# Patient Record
Sex: MALE | Race: WHITE | NOT HISPANIC OR LATINO | Employment: UNEMPLOYED | URBAN - METROPOLITAN AREA
[De-identification: names, ages, dates, MRNs, and addresses within clinical notes are randomized per-mention and may not be internally consistent; named-entity substitution may affect disease eponyms.]

---

## 2017-01-19 ENCOUNTER — OFFICE VISIT (OUTPATIENT)
Dept: INTERNAL MEDICINE | Facility: CLINIC | Age: 22
End: 2017-01-19
Payer: COMMERCIAL

## 2017-01-19 VITALS
HEART RATE: 69 BPM | DIASTOLIC BLOOD PRESSURE: 64 MMHG | WEIGHT: 235.8 LBS | OXYGEN SATURATION: 97 % | HEIGHT: 70 IN | SYSTOLIC BLOOD PRESSURE: 145 MMHG | TEMPERATURE: 99.8 F | BODY MASS INDEX: 33.76 KG/M2

## 2017-01-19 DIAGNOSIS — R10.31 RIGHT GROIN PAIN: ICD-10-CM

## 2017-01-19 DIAGNOSIS — S93.602A SPRAIN OF LEFT FOOT, INITIAL ENCOUNTER: Primary | ICD-10-CM

## 2017-01-19 PROCEDURE — 99214 OFFICE O/P EST MOD 30 MIN: CPT | Performed by: INTERNAL MEDICINE

## 2017-01-19 NOTE — PROGRESS NOTES
SUBJECTIVE:                                                    Mateo Carpenter is a 21 year old male who presents to clinic today for the following health issues:        Musculoskeletal problem/pain      Duration: 1 day    Description  Location: left foot    Intensity:  7-8/10 when put weight on it    Accompanying signs and symptoms: none    History  Previous similar problem: no   Previous evaluation:  none    Precipitating or alleviating factors:  Trauma or overuse: YES- Dropped a 45lb weight on it yesterday  Aggravating factors include: walking and climbing stairs    Therapies tried and outcome: ice helped a little     Patient has been able to walk on this leg.  He has noticed some bruising.  No numbness or weakness of the foot.  Some pain when you apply pressure on the foot.        Bump near right inguinal ligament:   Patient was seen by me for this in 9;2016, got a 10 day course of doxycycline, got better but there is still a scab and he reports intermittently the wound has opened with a small amount of liquid coming out.          Problem list and histories reviewed & adjusted, as indicated.  Additional history: as documented    Patient Active Problem List   Diagnosis     Allergic rhinitis due to animal dander     Diagnostic skin and sensitization tests     Vitamin D deficiency     Obesity (BMI 30.0-34.9)     Mild persistent asthma without complication     History reviewed. No pertinent past surgical history.    Social History   Substance Use Topics     Smoking status: Never Smoker      Smokeless tobacco: Never Used     Alcohol Use: No     Family History   Problem Relation Age of Onset     GASTROINTESTINAL DISEASE Mother      Fatty Liver      Asthma Father      Alzheimer Disease Maternal Grandmother      Dementia     Thyroid Disease Maternal Grandmother      Circulatory Maternal Grandfather      AAA     Respiratory Paternal Grandmother      emphezima          Current Outpatient Prescriptions   Medication  "Sig Dispense Refill     montelukast (SINGULAIR) 10 MG tablet Take 1 tablet (10 mg) by mouth At Bedtime 30 tablet 11     albuterol (PROAIR HFA, PROVENTIL HFA, VENTOLIN HFA) 108 (90 BASE) MCG/ACT inhaler Inhale 2 puffs into the lungs every 4 hours as needed for shortness of breath / dyspnea 1 Inhaler 2     fluticasone-salmeterol (ADVAIR DISKUS) 100-50 MCG/DOSE diskus inhaler Inhale 1 puff into the lungs 2 times daily 1 Inhaler 4     fluticasone (FLONASE) 50 MCG/ACT nasal spray Spray 2 sprays into both nostrils daily 1 Package 11       ==============================================================  ROS:  Constitutional, HEENT, cardiovascular, pulmonary, GI, , musculoskeletal, neuro, skin, endocrine and psych systems are negative, except as otherwise noted.       OBJECTIVE:                                                    /64 mmHg  Pulse 69  Temp(Src) 99.8  F (37.7  C) (Oral)  Ht 5' 9.5\" (1.765 m)  Wt 235 lb 12.8 oz (106.958 kg)  BMI 34.33 kg/m2  SpO2 97%  Body mass index is 34.33 kg/(m^2).     GENERAL APPEARANCE: healthy, alert and in no distress  EYES: Eyes grossly normal to inspection, and conjunctivae and sclerae normal  HENT: ear canals and TM's normal, nose and mouth without ulcers or lesions, oropharynx clear and oral mucous membranes moist  NECK: no adenopathy, no asymmetry, masses, or scars   RESP: lungs clear to auscultation - no rales, rhonchi or wheezes  CV: regular rate and rhythm, normal S1 S2, no S3 or S4, no murmur, click or rub, no peripheral edema and peripheral pulses strong  ABDOMEN: soft, nontender, no hepatosplenomegaly, no masses and bowel sounds normal  MS: no musculoskeletal defects are noted and gait is age appropriate without ataxia  SKIN:   Right groin with a small superficial abrasion about 2mm wide and 2 cm long, small amount of serous drainage  Left  Foot: small bruise near the 3rd-4th toes, o/w ROM and gross sensation of the toes is intact   o/w, no suspicious lesions or " rashes  NEURO: mentation intact and speech normal  PSYCH: mentation appears normal and affect normal/bright.       ASSESSMENT/PLAN:                                                        ICD-10-CM    1. Sprain of left foot, initial encounter S93.602A    2. Right groin pain R10.30      Time spent coordinating care was approximately 30 minutes out of a total of approximately 40 minutes (which was the total duration of the appointment) including the diagnosis, prognosis and treatment of the above medical conditions.     (S93.602A) Sprain of left foot, initial encounter  (primary encounter diagnosis)  Comment: sprain-doubt any fractures, kirill those that would    Plan: As per orders above and patient instructions below.     (R10.30) Right groin pain  Comment: recurrent (less severe than b/f-now w/o cellulitis yet)-superificial, d/t tight clothing and moist skin   Plan: As per orders above and patient instructions below.         Patient Instructions   For the foot:  Please try to elevate the foot, ice as needed, use ibuprofen as needed (ie, You may take ibuprofen as follows: 600mg every 6 hours as needed or 800mg every 8 hours as needed for pain), and return to clinic should your foot pain not completely resolve in 3 weeks or sooner if it begins to worsen.        For the small skin break in your right groin:  Please wear a bandaid now for 1-2 weeks until this completely heals.  Try to wear looser clothing (including wearing the belt looser).    Try to keep the area dry-ie, you may try to use a powder.    The HPV vaccine is usually administered as three doses: the 2nd dose is 2 months after the first dose and the third dose is 6 months after the first dose.  You may call your insurance to find out if they cover this vaccine and if you decide to proceed, you may call our clinic to schedule a vaccination appointment with a medical assistant.                       Adalgisa Sharma MD  [place]  [facility]

## 2017-01-19 NOTE — MR AVS SNAPSHOT
After Visit Summary   1/19/2017    Mateo Carpenter    MRN: [id number]           Patient Information     Date Of Birth          1995        Visit Information        Provider Department      1/19/2017 5:10 PM Adalgisa Sharma MD [facility]        Care Instructions    For the foot:  Please try to elevate the foot, ice as needed, use ibuprofen as needed (ie, You may take ibuprofen as follows: 600mg every 6 hours as needed or 800mg every 8 hours as needed for pain), and return to clinic should your foot pain not completely resolve in 3 weeks or sooner if it begins to worsen.        For the small skin break in your right groin:  Please wear a bandaid now for 1-2 weeks until this completely heals.  Try to wear looser clothing (including wearing the belt looser).    Try to keep the area dry-ie, you may try to use a powder.    The HPV vaccine is usually administered as three doses: the 2nd dose is 2 months after the first dose and the third dose is 6 months after the first dose.  You may call your insurance to find out if they cover this vaccine and if you decide to proceed, you may call our clinic to schedule a vaccination appointment with a medical assistant.          Follow-ups after your visit        Who to contact     If you have questions or need follow up information about today's clinic visit or your schedule please contact [facility] directly at [phone].  Normal or non-critical lab and imaging results will be communicated to you by MyChart, letter or phone within 4 business days after the clinic has received the results. If you do not hear from us within 7 days, please contact the clinic through MyChart or phone. If you have a critical or abnormal lab result, we will notify you by phone as soon as possible.  Submit refill requests through [employer] or call your pharmacy and they will forward the refill request to us. Please allow 3 business days for  "your refill to be completed.          Additional Information About Your Visit        [employer] Information     [employer] lets you send messages to your doctor, view your test results, renew your prescriptions, schedule appointments and more. To sign up, go to [email or web address] . Click on \"Log in\" on the left side of the screen, which will take you to the Welcome page. Then click on \"Sign up Now\" on the right side of the page.     You will be asked to enter the access code listed below, as well as some personal information. Please follow the directions to create your username and password.     Your access code is: [email or web address]  Expires: 2017  6:27 PM     Your access code will  in 90 days. If you need help or a new code, please call your [place] clinic or [phone].        Care EveryWhere ID     This is your [facility] EveryWhere ID. This could be used by other organizations to access your [place] medical records  [id number]        Your Vitals Were     Pulse Temperature Height BMI (Body Mass Index) Pulse Oximetry       69 99.8  F (37.7  C) (Oral) 5' 9.5\" (1.765 m) 34.33 kg/m2 97%        Blood Pressure from Last 3 Encounters:   17 145/64   16 136/62   16 120/70    Weight from Last 3 Encounters:   17 235 lb 12.8 oz (106.958 kg) (98.41 %*)   16 232 lb (105.235 kg) (98.24 %*)   16 238 lb (107.956 kg) (98.73 %*)     * Growth percentiles are based on CDC 2-20 Years data.              Today, you had the following     No orders found for display       Primary Care Provider Office Phone # Fax #    Pee Pino [phone]       [facility] [address] [facility] [place] [address]        Thank you!     Thank you for choosing [facility]  for your care. Our goal is always to provide you with excellent care. Hearing back from our patients is one way we can continue to improve our services. Please take a few minutes to complete the " written survey that you may receive in the mail after your visit with us. Thank you!             Your Updated Medication List - Protect others around you: Learn how to safely use, store and throw away your medicines at www.disposemymeds.org.          This list is accurate as of: 1/19/17  6:27 PM.  Always use your most recent med list.                   Brand Name Dispense Instructions for use    albuterol 108 (90 BASE) MCG/ACT Inhaler    PROAIR HFA/PROVENTIL HFA/VENTOLIN HFA    1 Inhaler    Inhale 2 puffs into the lungs every 4 hours as needed for shortness of breath / dyspnea       fluticasone 50 MCG/ACT spray    FLONASE    1 Package    Spray 2 sprays into both nostrils daily       fluticasone-salmeterol 100-50 MCG/DOSE diskus inhaler    ADVAIR DISKUS    1 Inhaler    Inhale 1 puff into the lungs 2 times daily       montelukast 10 MG tablet    SINGULAIR    30 tablet    Take 1 tablet (10 mg) by mouth At Bedtime

## 2017-01-19 NOTE — NURSING NOTE
"Chief Complaint   Patient presents with     Musculoskeletal Problem     Left foot pain Droppped a weight on it yesterday.       Initial /64 mmHg  Pulse 69  Temp(Src) 99.8  F (37.7  C) (Oral)  Ht 5' 9.5\" (1.765 m)  Wt 235 lb 12.8 oz (106.958 kg)  BMI 34.33 kg/m2  SpO2 97% Estimated body mass index is 34.33 kg/(m^2) as calculated from the following:    Height as of this encounter: 5' 9.5\" (1.765 m).    Weight as of this encounter: 235 lb 12.8 oz (106.958 kg).  BP completed using cuff size: rk Escalante, NATHAN    "

## 2017-01-20 NOTE — PATIENT INSTRUCTIONS
For the foot:  Please try to elevate the foot, ice as needed, use ibuprofen as needed (ie, You may take ibuprofen as follows: 600mg every 6 hours as needed or 800mg every 8 hours as needed for pain), and return to clinic should your foot pain not completely resolve in 3 weeks or sooner if it begins to worsen.        For the small skin break in your right groin:  Please wear a bandaid now for 1-2 weeks until this completely heals.  Try to wear looser clothing (including wearing the belt looser).    Try to keep the area dry-ie, you may try to use a powder.    The HPV vaccine is usually administered as three doses: the 2nd dose is 2 months after the first dose and the third dose is 6 months after the first dose.  You may call your insurance to find out if they cover this vaccine and if you decide to proceed, you may call our clinic to schedule a vaccination appointment with a medical assistant.

## 2017-02-09 ENCOUNTER — TELEPHONE (OUTPATIENT)
Dept: FAMILY MEDICINE | Facility: CLINIC | Age: 22
End: 2017-02-09

## 2017-02-09 NOTE — TELEPHONE ENCOUNTER
Reason for Call:  Other appointment    Detailed comments: pt calling wants to discuss with nurse about making appt for asthma recheck and also checking vitamin D deficiency. Please advise and contact pt in regards.    Phone Number Patient can be reached at: Home number on file [phone] (home)    Best Time: ANY    Can we leave a detailed message on this number? YES    Call taken on 2/9/2017 at 4:29 PM by Liza Christy

## 2017-03-01 ENCOUNTER — OFFICE VISIT (OUTPATIENT)
Dept: FAMILY MEDICINE | Facility: CLINIC | Age: 22
End: 2017-03-01
Payer: COMMERCIAL

## 2017-03-01 VITALS
TEMPERATURE: 98.2 F | HEART RATE: 64 BPM | DIASTOLIC BLOOD PRESSURE: 68 MMHG | WEIGHT: 234 LBS | SYSTOLIC BLOOD PRESSURE: 139 MMHG | BODY MASS INDEX: 34.66 KG/M2 | HEIGHT: 69 IN

## 2017-03-01 DIAGNOSIS — Z00.00 ROUTINE GENERAL MEDICAL EXAMINATION AT A HEALTH CARE FACILITY: Primary | ICD-10-CM

## 2017-03-01 DIAGNOSIS — Z23 NEED FOR VACCINATION: ICD-10-CM

## 2017-03-01 DIAGNOSIS — L03.818 CELLULITIS OF OTHER SPECIFIED SITE: ICD-10-CM

## 2017-03-01 PROCEDURE — 90471 IMMUNIZATION ADMIN: CPT | Performed by: FAMILY MEDICINE

## 2017-03-01 PROCEDURE — 90651 9VHPV VACCINE 2/3 DOSE IM: CPT | Performed by: FAMILY MEDICINE

## 2017-03-01 PROCEDURE — 99395 PREV VISIT EST AGE 18-39: CPT | Performed by: FAMILY MEDICINE

## 2017-03-01 RX ORDER — DOXYCYCLINE 100 MG/1
100 CAPSULE ORAL 2 TIMES DAILY
Qty: 20 CAPSULE | Refills: 0 | Status: CANCELLED | OUTPATIENT
Start: 2017-03-01

## 2017-03-01 NOTE — PROGRESS NOTES
SUBJECTIVE:     CC: Mateo Carpenter is an 22 year old male who presents for preventative health visit.     Healthy Habits:    Do you get at least three servings of calcium containing foods daily (dairy, green leafy vegetables, etc.)? yes    Amount of exercise or daily activities, outside of work: 1 hour(s) per day    Problems taking medications regularly No    Medication side effects: No    Have you had an eye exam in the past two years? no    Do you see a dentist twice per year? no    Do you have sleep apnea, excessive snoring or daytime drowsiness?yes             Today's PHQ-2 Score:   PHQ-2 ( 1999 Pfizer) 1/19/2017 9/2/2016   Q1: Little interest or pleasure in doing things 0 0   Q2: Feeling down, depressed or hopeless 0 0   PHQ-2 Score 0 0       Abuse: Current or Past(Physical, Sexual or Emotional)- No  Do you feel safe in your environment - Yes    Social History   Substance Use Topics     Smoking status: Never Smoker     Smokeless tobacco: Never Used     Alcohol use No     The patient does not drink >3 drinks per day nor >7 drinks per week.    Last PSA: No results found for: PSA    Recent Labs   Lab Test  05/14/14   0743  03/03/14   1546   CHOL  170*  199*   HDL  28*  34*   LDL  81   --    TRIG  304*   --    CHOLHDLRATIO  6.1*   --        Reviewed orders with patient. Reviewed health maintenance and updated orders accordingly - Yes    Reviewed and updated as needed this visit by clinical staff         Reviewed and updated as needed this visit by Provider          ROS:  C: NEGATIVE for fever, chills, change in weight  I: NEGATIVE for worrisome rashes, moles or lesions  E: NEGATIVE for vision changes or irritation  ENT: NEGATIVE for ear, mouth and throat problems  R: NEGATIVE for significant cough or SOB  CV: NEGATIVE for chest pain, palpitations or peripheral edema  GI: NEGATIVE for nausea, abdominal pain, heartburn, or change in bowel habits   male: negative for dysuria, hematuria, decreased urinary  "stream, erectile dysfunction, urethral discharge  M: NEGATIVE for significant arthralgias or myalgia  N: NEGATIVE for weakness, dizziness or paresthesias  P: NEGATIVE for changes in mood or affect    Problem list, Medication list, Allergies, and Medical/Social/Surgical histories reviewed in [facility] and updated as appropriate.  OBJECTIVE:     There were no vitals taken for this visit. OBJECTIVE:  no apparent distress  /68  Pulse 64  Temp 98.2  F (36.8  C) (Oral)  Ht 5' 9.45\" (1.764 m)  Wt 234 lb (106.1 kg)  BMI 34.11 kg/m2     EXAM:  GENERAL: healthy, alert and no distress  EYES: Eyes grossly normal to inspection, PERRL and conjunctivae and sclerae normal  HENT: ear canals and TM's normal, nose and mouth without ulcers or lesions  NECK: no adenopathy, no asymmetry, masses, or scars and thyroid normal to palpation  RESP: lungs clear to auscultation - no rales, rhonchi or wheezes  CV: regular rate and rhythm, normal S1 S2, no S3 or S4, no murmur, click or rub, no peripheral edema and peripheral pulses strong  ABDOMEN: soft, nontender, no hepatosplenomegaly, no masses and bowel sounds normal  MS: no gross musculoskeletal defects noted, no edema  SKIN: no suspicious lesions or rashes  NEURO: Normal strength and tone, mentation intact and speech normal  PSYCH: mentation appears normal, affect normal/bright    ASSESSMENT/PLAN:         ICD-10-CM    1. Routine general medical examination at a health care facility Z00.00    2. Cellulitis of other specified site L03.818 doxycycline (VIBRAMYCIN) 100 MG capsule   3. BMI 32.0-32.9,adult Z68.32        COUNSELING:  Reviewed preventive health counseling, as reflected in patient instructions       Regular exercise       Healthy diet/nutrition    BP Screening:   Last 3 BP Readings:    BP Readings from Last 3 Encounters:   03/01/17 139/68   01/19/17 145/64   09/02/16 136/62       The following was recommended to the patient:  Re-screen BP within a year and recommended lifestyle " "modifications     reports that he has never smoked. He has never used smokeless tobacco.    Estimated body mass index is 34.32 kg/(m^2) as calculated from the following:    Height as of 1/19/17: 5' 9.5\" (1.765 m).    Weight as of 1/19/17: 235 lb 12.8 oz (107 kg).   Weight management plan: diet and excercise    Counseling Resources:  ATP IV Guidelines  Pooled Cohorts Equation Calculator  FRAX Risk Assessment  ICSI Preventive Guidelines  Dietary Guidelines for Americans, 2010  USDA's MyPlate  ASA Prophylaxis  Lung CA Screening    Pee Pino MD  [facility]  "

## 2017-03-01 NOTE — MR AVS SNAPSHOT
After Visit Summary   3/1/2017    Mateo Carpenter    MRN: [id number]           Patient Information     Date Of Birth          1995        Visit Information        Provider Department      3/1/2017 4:15 PM Pee Pino MD [facility]        Today's Diagnoses     Routine general medical examination at a health care facility    -  1    Cellulitis of other specified site        BMI 32.0-32.9,adult          Care Instructions      Preventive Health Recommendations  Male Ages 18 - 25     Yearly exam:             See your health care provider every year in order to  o   Review health changes.   o   Discuss preventive care.    o   Review your medicines if your doctor has prescribed any.    You should be tested each year for STDs (sexually transmitted diseases).     Talk to your provider about cholesterol testing.      If you are at risk for diabetes, you should have a diabetes test (fasting glucose).    Shots: Get a flu shot each year. Get a tetanus shot every 10 years.     Nutrition:    Eat at least 5 servings of fruits and vegetables daily.     Eat whole-grain bread, whole-wheat pasta and brown rice instead of white grains and rice.     Talk to your provider about calcium and Vitamin D.     Lifestyle    Exercise for at least 150 minutes a week (30 minutes a day, 5 days a week). This will help you control your weight and prevent disease.     Limit alcohol to one drink per day.     No smoking.     Wear sunscreen to prevent skin cancer.     See your dentist every six months for an exam and cleaning.           Follow-ups after your visit        Who to contact     If you have questions or need follow up information about today's clinic visit or your schedule please contact [facility] directly at [phone].  Normal or non-critical lab and imaging results will be communicated to you by MyChart, letter or phone within 4 business days after the clinic has received the  "results. If you do not hear from us within 7 days, please contact the clinic through [employer] or phone. If you have a critical or abnormal lab result, we will notify you by phone as soon as possible.  Submit refill requests through [employer] or call your pharmacy and they will forward the refill request to us. Please allow 3 business days for your refill to be completed.          Additional Information About Your Visit        [employer] Information     [employer] lets you send messages to your doctor, view your test results, renew your prescriptions, schedule appointments and more. To sign up, go to [email or web address] . Click on \"Log in\" on the left side of the screen, which will take you to the Welcome page. Then click on \"Sign up Now\" on the right side of the page.     You will be asked to enter the access code listed below, as well as some personal information. Please follow the directions to create your username and password.     Your access code is: [email or web address]  Expires: 2017  6:27 PM     Your access code will  in 90 days. If you need help or a new code, please call your [facility] or [phone].        Care EveryWhere ID     This is your Care EveryWhere ID. This could be used by other organizations to access your [place] medical records  [id number]        Your Vitals Were     Pulse Temperature Height BMI (Body Mass Index)          64 98.2  F (36.8  C) (Oral) 5' 9.45\" (1.764 m) 34.11 kg/m2         Blood Pressure from Last 3 Encounters:   17 139/68   17 145/64   16 136/62    Weight from Last 3 Encounters:   17 234 lb (106.1 kg)   17 235 lb 12.8 oz (107 kg) (98 %)*   16 232 lb (105.2 kg) (98 %)*     * Growth percentiles are based on CDC 2-20 Years data.              Today, you had the following     No orders found for display       Primary Care Provider Office Phone # Fax #    Pee Pino [phone]       [facility] " [address] [facility] [address]        Thank you!     Thank you for choosing [facility]  for your care. Our goal is always to provide you with excellent care. Hearing back from our patients is one way we can continue to improve our services. Please take a few minutes to complete the written survey that you may receive in the mail after your visit with us. Thank you!             Your Updated Medication List - Protect others around you: Learn how to safely use, store and throw away your medicines at www.disposemymeds.org.          This list is accurate as of: 3/1/17  4:53 PM.  Always use your most recent med list.                   Brand Name Dispense Instructions for use    albuterol 108 (90 BASE) MCG/ACT Inhaler    PROAIR HFA/PROVENTIL HFA/VENTOLIN HFA    1 Inhaler    Inhale 2 puffs into the lungs every 4 hours as needed for shortness of breath / dyspnea       fluticasone 50 MCG/ACT spray    FLONASE    1 Package    Spray 2 sprays into both nostrils daily       fluticasone-salmeterol 100-50 MCG/DOSE diskus inhaler    ADVAIR DISKUS    1 Inhaler    Inhale 1 puff into the lungs 2 times daily       montelukast 10 MG tablet    SINGULAIR    30 tablet    Take 1 tablet (10 mg) by mouth At Bedtime

## 2017-03-01 NOTE — NURSING NOTE
Prior to injection verified patient identity using patient's name and date of birth.    Patient instructed to wait in clinic for 20 minutes following injection and to notify staff of any adverse reactions immediately.     Screening Questionnaire for Adult Immunization     Are you sick today?   No   Do you have allergies to medications, food or any vaccine?   No   Have you ever had a serious reaction after receiving a vaccination?   No   Do you have a long-term health problem with heart disease, lung disease,  asthma, kidney disease, diabetes, anemia, metabolic or blood disease?   No   Do you have cancer, leukemia, AIDS, or any immune system problem?   No   Do you take cortisone, prednisone, other steroids, or anticancer drugs, or  have you had any x-ray (radiation) treatments?   No   Have you had a seizure, brain, or other nervous system problem?   No   During the past year, have you received a transfusion of blood or blood       products, or been given a medicine called immune (gamma) globulin?   No   For women: Are you pregnant or is there a chance you could become         pregnant during the next month?   No   Have you received any vaccinations in the past 4 weeks?   No    Immunization questionnaire answers were all negative.      MNVFC doesn't apply on this patient  [place], [facility]

## 2017-03-01 NOTE — NURSING NOTE
"Chief Complaint   Patient presents with     Physical     wants refill of doxycline /med pended if ok        Initial /68  Pulse 64  Temp 98.2  F (36.8  C) (Oral)  Ht 5' 9.45\" (1.764 m)  Wt 234 lb (106.1 kg)  BMI 34.11 kg/m2 Estimated body mass index is 34.11 kg/(m^2) as calculated from the following:    Height as of this encounter: 5' 9.45\" (1.764 m).    Weight as of this encounter: 234 lb (106.1 kg).  Medication Reconciliation: complete  "

## 2017-03-02 ASSESSMENT — ASTHMA QUESTIONNAIRES: ACT_TOTALSCORE: 25

## 2017-03-29 ENCOUNTER — OFFICE VISIT (OUTPATIENT)
Dept: FAMILY MEDICINE | Facility: CLINIC | Age: 22
End: 2017-03-29
Payer: COMMERCIAL

## 2017-03-29 VITALS
BODY MASS INDEX: 33.93 KG/M2 | HEIGHT: 70 IN | HEART RATE: 73 BPM | DIASTOLIC BLOOD PRESSURE: 62 MMHG | SYSTOLIC BLOOD PRESSURE: 129 MMHG | WEIGHT: 237 LBS | TEMPERATURE: 99.1 F

## 2017-03-29 DIAGNOSIS — Z13.1 SCREENING FOR DIABETES MELLITUS: ICD-10-CM

## 2017-03-29 DIAGNOSIS — Z83.49 FAMILY HISTORY OF THYROID DISEASE: ICD-10-CM

## 2017-03-29 DIAGNOSIS — R53.83 FATIGUE, UNSPECIFIED TYPE: Primary | ICD-10-CM

## 2017-03-29 LAB
HGB BLD-MCNC: 15 G/DL (ref 13.3–17.7)
TSH SERPL DL<=0.005 MIU/L-ACNC: 0.74 MU/L (ref 0.4–4)

## 2017-03-29 PROCEDURE — 84443 ASSAY THYROID STIM HORMONE: CPT | Performed by: FAMILY MEDICINE

## 2017-03-29 PROCEDURE — 36415 COLL VENOUS BLD VENIPUNCTURE: CPT | Performed by: FAMILY MEDICINE

## 2017-03-29 PROCEDURE — 82306 VITAMIN D 25 HYDROXY: CPT | Performed by: FAMILY MEDICINE

## 2017-03-29 PROCEDURE — 85018 HEMOGLOBIN: CPT | Performed by: FAMILY MEDICINE

## 2017-03-29 PROCEDURE — 99213 OFFICE O/P EST LOW 20 MIN: CPT | Performed by: FAMILY MEDICINE

## 2017-03-29 NOTE — MR AVS SNAPSHOT
"              After Visit Summary   3/29/2017    Mateo Carpenter    MRN: [id number]           Patient Information     Date Of Birth          1995        Visit Information        Provider Department      3/29/2017 4:15 PM Pee Pino MD [facility]        Today's Diagnoses     Fatigue, unspecified type    -  1    Screening for diabetes mellitus        Family history of thyroid disease           Follow-ups after your visit        Future tests that were ordered for you today     Open Future Orders        Priority Expected Expires Ordered    Glucose, whole blood Routine  3/29/2018 3/29/2017            Who to contact     If you have questions or need follow up information about today's clinic visit or your schedule please contact [facility] directly at [phone].  Normal or non-critical lab and imaging results will be communicated to you by MyChart, letter or phone within 4 business days after the clinic has received the results. If you do not hear from us within 7 days, please contact the clinic through [employer] or phone. If you have a critical or abnormal lab result, we will notify you by phone as soon as possible.  Submit refill requests through [employer] or call your pharmacy and they will forward the refill request to us. Please allow 3 business days for your refill to be completed.          Additional Information About Your Visit        MyChart Information     [employer] lets you send messages to your doctor, view your test results, renew your prescriptions, schedule appointments and more. To sign up, go to [email or web address] . Click on \"Log in\" on the left side of the screen, which will take you to the Welcome page. Then click on \"Sign up Now\" on the right side of the page.     You will be asked to enter the access code listed below, as well as some personal information. Please follow the directions to create your username and password.     Your access code is: " [email or web address]  Expires: 2017  7:27 PM     Your access code will  in 90 days. If you need help or a new code, please call your [place] clinic or [phone].        Care EveryWhere ID     This is your Care EveryWhere ID. This could be used by other organizations to access your [place] medical records  [id number]        Your Vitals Were     Pulse Temperature Height BMI (Body Mass Index)          73 99.1  F (37.3  C) (Oral) 5' 9.5\" (1.765 m) 34.5 kg/m2         Blood Pressure from Last 3 Encounters:   17 129/62   17 139/68   17 145/64    Weight from Last 3 Encounters:   17 237 lb (107.5 kg)   17 234 lb (106.1 kg)   17 235 lb 12.8 oz (107 kg) (98 %)*     * Growth percentiles are based on CDC 2-20 Years data.              We Performed the Following     Hemoglobin     TSH with free T4 reflex     Vitamin D Deficiency        Primary Care Provider Office Phone # Fax #    Pee Pino [phone]       [facility] [address] [facility] [address]        Thank you!     Thank you for choosing [facility]  for your care. Our goal is always to provide you with excellent care. Hearing back from our patients is one way we can continue to improve our services. Please take a few minutes to complete the written survey that you may receive in the mail after your visit with us. Thank you!             Your Updated Medication List - Protect others around you: Learn how to safely use, store and throw away your medicines at www.disposemymeds.org.          This list is accurate as of: 3/29/17  4:44 PM.  Always use your most recent med list.                   Brand Name Dispense Instructions for use    albuterol 108 (90 BASE) MCG/ACT Inhaler    PROAIR HFA/PROVENTIL HFA/VENTOLIN HFA    1 Inhaler    Inhale 2 puffs into the lungs every 4 hours as needed for shortness of breath / dyspnea       cholecalciferol 1000 UNIT tablet    vitamin D     " Take by mouth 2 times daily       fluticasone 50 MCG/ACT spray    FLONASE    1 Package    Spray 2 sprays into both nostrils daily       fluticasone-salmeterol 100-50 MCG/DOSE diskus inhaler    ADVAIR DISKUS    1 Inhaler    Inhale 1 puff into the lungs 2 times daily       montelukast 10 MG tablet    SINGULAIR    30 tablet    Take 1 tablet (10 mg) by mouth At Bedtime

## 2017-03-29 NOTE — LETTER
[facility]  [address] [facility] [address] [phone]        March 30, 2017    Mateo Carpenter  [address] [facility] [address]            Dear Mateo,    The results of your recent tests were normal.  Below is a copy of the results.  It was a pleasure to see you at your last appointment.    If you have any questions or concerns, please call myself or my nurse at [phone].    Sincerely,    Pee Pino MD    Results for orders placed or performed in visit on 03/29/17   TSH with free T4 reflex   Result Value Ref Range    TSH 0.74 0.40 - 4.00 mU/L   Hemoglobin   Result Value Ref Range    Hemoglobin 15.0 13.3 - 17.7 g/dL

## 2017-03-29 NOTE — PROGRESS NOTES
"  SUBJECTIVE:                                                    Mateo Carpenter is a 22 year old male who presents to clinic today for the following health issues:      Pt states has had increase fatigue , wondering about vitamin d levels and thyroid .  Pt has been taking vitamin D3 2000 units daily .  Family history of hypothyroidism   SUBJECTIVE:  22 year old.The patient has a history of fatigue.  This started 1-2 years ago.  quality -takes naps during the day Associated symptoms are insomnia and wakes a lot.  Gets more than 8 hours of sleep. Family history of thyroid ROS weight stable, temperature tolerant, claims no depression   Reviewed health maintenance  Patient Active Problem List   Diagnosis     Allergic rhinitis due to animal dander     Diagnostic skin and sensitization tests     Vitamin D deficiency     Obesity (BMI 30.0-34.9)     Mild persistent asthma without complication     Past Medical History:   Diagnosis Date     Allergic rhinitis due to animal dander 3/99 skin tests pos. for cat/dog only--tests per Dr. Matos     Asthma, mild persistent      Diagnostic skin and sensitization tests 3/99 skin tests pos. for cat/dog only     Obesity (BMI 30.0-34.9)        OBJECTIVE:  no apparent distress  /62  Pulse 73  Temp 99.1  F (37.3  C) (Oral)  Ht 5' 9.5\" (1.765 m)  Wt 237 lb (107.5 kg)  BMI 34.5 kg/m2  Thyroid normal  Skin cool  LUNGS:  CTA B/L, no wheezing or crackles.   Cardiovascular: negative, PMI normal. No lifts, heaves, or thrills. RRR. No murmurs, clicks gallops or rub   Gastrointestinal: Abdomen soft, non-tender. BS normal. No masses, organomegaly       ICD-10-CM    1. Fatigue, unspecified type R53.83     PLAN: if normal blood work consider sleep consult.      "

## 2017-03-29 NOTE — LETTER
[facility]  [address] [facility] [address] [phone]        April 3, 2017    Mateo Carpenter  [address] [facility] [address]            Dear Mateo,    The results of your recent tests were normal.  Below is a copy of the results.  It was a pleasure to see you at your last appointment.    If you have any questions or concerns, please call myself or my nurse at [phone].    Sincerely,    Pee Pino MD     Results for orders placed or performed in visit on 03/29/17   TSH with free T4 reflex   Result Value Ref Range    TSH 0.74 0.40 - 4.00 mU/L   Vitamin D Deficiency   Result Value Ref Range    Vitamin D Deficiency screening 34 20 - 75 ug/L   Hemoglobin   Result Value Ref Range    Hemoglobin 15.0 13.3 - 17.7 g/dL

## 2017-03-29 NOTE — NURSING NOTE
"Chief Complaint   Patient presents with     Fatigue       Initial /62  Pulse 73  Temp 99.1  F (37.3  C) (Oral)  Ht 5' 9.5\" (1.765 m)  Wt 237 lb (107.5 kg)  BMI 34.5 kg/m2 Estimated body mass index is 34.5 kg/(m^2) as calculated from the following:    Height as of this encounter: 5' 9.5\" (1.765 m).    Weight as of this encounter: 237 lb (107.5 kg).  Medication Reconciliation: complete  Sary Palma , NATHAN     "

## 2017-03-30 LAB — DEPRECATED CALCIDIOL+CALCIFEROL SERPL-MC: 34 UG/L (ref 20–75)

## 2017-05-12 ENCOUNTER — TELEPHONE (OUTPATIENT)
Dept: FAMILY MEDICINE | Facility: CLINIC | Age: 22
End: 2017-05-12

## 2017-05-15 ENCOUNTER — ALLIED HEALTH/NURSE VISIT (OUTPATIENT)
Dept: NURSING | Facility: CLINIC | Age: 22
End: 2017-05-15
Payer: COMMERCIAL

## 2017-05-15 DIAGNOSIS — Z23 NEED FOR VACCINATION: Primary | ICD-10-CM

## 2017-05-15 PROCEDURE — 90651 9VHPV VACCINE 2/3 DOSE IM: CPT

## 2017-05-15 PROCEDURE — 90471 IMMUNIZATION ADMIN: CPT

## 2017-05-15 PROCEDURE — 99207 ZZC NO CHARGE NURSE ONLY: CPT

## 2017-05-15 NOTE — NURSING NOTE
Screening Questionnaire for Adult Immunization    Are you sick today?   No   Do you have allergies to medications, food, a vaccine component or latex?   No   Have you ever had a serious reaction after receiving a vaccination?   No   Do you have a long-term health problem with heart disease, lung disease, asthma, kidney disease, metabolic disease (e.g. diabetes), anemia, or other blood disorder?   No   Do you have cancer, leukemia, HIV/AIDS, or any other immune system problem?   No   In the past 3 months, have you taken medications that affect  your immune system, such as prednisone, other steroids, or anticancer drugs; drugs for the treatment of rheumatoid arthritis, Crohn s disease, or psoriasis; or have you had radiation treatments?   No   Have you had a seizure, or a brain or other nervous system problem?   No   During the past year, have you received a transfusion of blood or blood     products, or been given immune (gamma) globulin or antiviral drug?   No   For women: Are you pregnant or is there a chance you could become        pregnant during the next month?   No   Have you received any vaccinations in the past 4 weeks?   No     Immunization questionnaire answers were all negative.      MNVFC doesn't apply on this patient    Per orders of Dr. Pino, injection of HPV given by Amanda Duke. Patient instructed to remain in clinic for 20 minutes afterwards, and to report any adverse reaction to me immediately.       Screening performed by Amanda Duke on 5/15/2017 at 4:53 PM.

## 2017-05-15 NOTE — MR AVS SNAPSHOT
"              After Visit Summary   5/15/2017    Mateo Carpenter    MRN: [id number]           Patient Information     Date Of Birth          1995        Visit Information        Provider Department      5/15/2017 4:15 PM AN ANCILLARY [facility]        Today's Diagnoses     Need for vaccination    -  1       Follow-ups after your visit        Who to contact     If you have questions or need follow up information about today's clinic visit or your schedule please contact [facility] directly at [phone].  Normal or non-critical lab and imaging results will be communicated to you by [employer], letter or phone within 4 business days after the clinic has received the results. If you do not hear from us within 7 days, please contact the clinic through [employer] or phone. If you have a critical or abnormal lab result, we will notify you by phone as soon as possible.  Submit refill requests through [employer] or call your pharmacy and they will forward the refill request to us. Please allow 3 business days for your refill to be completed.          Additional Information About Your Visit        MyChart Information     [employer] lets you send messages to your doctor, view your test results, renew your prescriptions, schedule appointments and more. To sign up, go to [email or web address] . Click on \"Log in\" on the left side of the screen, which will take you to the Welcome page. Then click on \"Sign up Now\" on the right side of the page.     You will be asked to enter the access code listed below, as well as some personal information. Please follow the directions to create your username and password.     Your access code is: [email or web address]  Expires: 2017  4:53 PM     Your access code will  in 90 days. If you need help or a new code, please call your [facility] or [phone].        Care EveryWhere ID     This is your Care EveryWhere ID. This could be used by other organizations to " access your [place] medical records  [id number]         Blood Pressure from Last 3 Encounters:   03/29/17 129/62   03/01/17 139/68   01/19/17 145/64    Weight from Last 3 Encounters:   03/29/17 237 lb (107.5 kg)   03/01/17 234 lb (106.1 kg)   01/19/17 235 lb 12.8 oz (107 kg) (98 %)*     * Growth percentiles are based on CDC 2-20 Years data.              We Performed the Following     1st  Administration  [[id number]]     HUMAN PAPILLOMA VIRUS (GARDASIL 9) VACCINE [[id number]]        Primary Care Provider Office Phone # Fax #    Pee Pino [phone]       [facility] [address] [facility] [address]        Thank you!     Thank you for choosing [facility]  for your care. Our goal is always to provide you with excellent care. Hearing back from our patients is one way we can continue to improve our services. Please take a few minutes to complete the written survey that you may receive in the mail after your visit with us. Thank you!             Your Updated Medication List - Protect others around you: Learn how to safely use, store and throw away your medicines at www.disposemymeds.org.          This list is accurate as of: 5/15/17  4:53 PM.  Always use your most recent med list.                   Brand Name Dispense Instructions for use    albuterol 108 (90 BASE) MCG/ACT Inhaler    PROAIR HFA/PROVENTIL HFA/VENTOLIN HFA    1 Inhaler    Inhale 2 puffs into the lungs every 4 hours as needed for shortness of breath / dyspnea       cholecalciferol 1000 UNIT tablet    vitamin D     Take by mouth 2 times daily       fluticasone 50 MCG/ACT spray    FLONASE    1 Package    Spray 2 sprays into both nostrils daily       fluticasone-salmeterol 100-50 MCG/DOSE diskus inhaler    ADVAIR DISKUS    1 Inhaler    Inhale 1 puff into the lungs 2 times daily       montelukast 10 MG tablet    SINGULAIR    30 tablet    Take 1 tablet (10 mg) by mouth At Bedtime

## 2017-05-15 NOTE — TELEPHONE ENCOUNTER
LM for patient letting him know 1-2 months from 1st injection so he can schedule that now then at least 12 weeks between 2nd and 3rd injection.    Garima Lay,

## 2018-06-29 ENCOUNTER — RADIANT APPOINTMENT (OUTPATIENT)
Dept: GENERAL RADIOLOGY | Facility: CLINIC | Age: 23
End: 2018-06-29
Attending: PHYSICIAN ASSISTANT
Payer: COMMERCIAL

## 2018-06-29 ENCOUNTER — OFFICE VISIT (OUTPATIENT)
Dept: FAMILY MEDICINE | Facility: CLINIC | Age: 23
End: 2018-06-29
Payer: COMMERCIAL

## 2018-06-29 ENCOUNTER — OFFICE VISIT (OUTPATIENT)
Dept: ORTHOPEDICS | Facility: CLINIC | Age: 23
End: 2018-06-29
Payer: COMMERCIAL

## 2018-06-29 VITALS
DIASTOLIC BLOOD PRESSURE: 82 MMHG | BODY MASS INDEX: 36.58 KG/M2 | HEART RATE: 91 BPM | HEIGHT: 69 IN | SYSTOLIC BLOOD PRESSURE: 133 MMHG | WEIGHT: 247 LBS

## 2018-06-29 VITALS
SYSTOLIC BLOOD PRESSURE: 136 MMHG | TEMPERATURE: 99.7 F | OXYGEN SATURATION: 98 % | BODY MASS INDEX: 34.5 KG/M2 | WEIGHT: 237 LBS | DIASTOLIC BLOOD PRESSURE: 78 MMHG | HEART RATE: 67 BPM | RESPIRATION RATE: 18 BRPM

## 2018-06-29 DIAGNOSIS — S62.346A CLOSED NONDISPLACED FRACTURE OF BASE OF FIFTH METACARPAL BONE OF RIGHT HAND, INITIAL ENCOUNTER: Primary | ICD-10-CM

## 2018-06-29 DIAGNOSIS — S69.91XA HAND INJURY, RIGHT, INITIAL ENCOUNTER: ICD-10-CM

## 2018-06-29 DIAGNOSIS — S69.91XA INJURY OF RIGHT HAND, INITIAL ENCOUNTER: ICD-10-CM

## 2018-06-29 DIAGNOSIS — S62.316D CLOSED DISPLACED FRACTURE OF BASE OF FIFTH METACARPAL BONE OF RIGHT HAND WITH ROUTINE HEALING, SUBSEQUENT ENCOUNTER: Primary | ICD-10-CM

## 2018-06-29 PROCEDURE — 99204 OFFICE O/P NEW MOD 45 MIN: CPT | Performed by: FAMILY MEDICINE

## 2018-06-29 PROCEDURE — 73130 X-RAY EXAM OF HAND: CPT | Mod: RT

## 2018-06-29 PROCEDURE — 99214 OFFICE O/P EST MOD 30 MIN: CPT | Performed by: PHYSICIAN ASSISTANT

## 2018-06-29 ASSESSMENT — ENCOUNTER SYMPTOMS
HEMOPTYSIS: 0
COUGH: 0
EYE PAIN: 0
FEVER: 0
CARDIOVASCULAR NEGATIVE: 1
RESPIRATORY NEGATIVE: 1
DIAPHORESIS: 0
PALPITATIONS: 0
WEIGHT LOSS: 0
CONSTITUTIONAL NEGATIVE: 1

## 2018-06-29 ASSESSMENT — PAIN SCALES - GENERAL: PAINLEVEL: SEVERE PAIN (7)

## 2018-06-29 NOTE — LETTER
"    2018         RE: Mateo Carpenter  [address] [facility] [address]        Dear Colleague,    Thank you for referring your patient, Mateo Carpenter, to the [facility] [place] [facility]. Please see a copy of my visit note below.    Mateo Carpenter  :  1995  DOS: 2018  MRN: [id number]    Sports Medicine Clinic Visit    PCP: Pee Pino    Mateo Carpenter is a 23 year old Right hand dominant male who is seen in consultation at the request of  Lilliam Sandoval PA-C presenting with right hand injury. Patient hit a shed last night. Presents with hand wrapped in an ACE bandage with volar splint.     Injury: 1 day(s).  Pain located over right medial edge of hand, nonradiating.  Additional Features:  Positive: swelling.  Symptoms are better with Ice and Rest.  Symptoms are worse with: gripping, rotation of hand, ADLs.  Other evaluation and/or treatments so far consists of: Nothing.  Recent imaging completed: X-rays completed 2018.  Prior History of related problems: none    Social History:  at [employer]    Review of Systems  Musculoskeletal: as above  Remainder of review of systems is negative including constitutional, CV, pulmonary, GI, Skin and Neurologic except as noted in HPI or medical history.    Past Medical History:   Diagnosis Date     Allergic rhinitis due to animal dander 3/99 skin tests pos. for cat/dog only--tests per Dr. Matos     Asthma, mild persistent      Diagnostic skin and sensitization tests 3/99 skin tests pos. for cat/dog only     Obesity (BMI 30.0-34.9)      No past surgical history on file.    Objective  /82 (BP Location: Right arm, Patient Position: Chair, Cuff Size: Adult Large)  Pulse 91  Ht 5' 9\" (1.753 m)  Wt 247 lb (112 kg)  BMI 36.48 kg/m2    General: healthy, alert and in no distress    HEENT: no scleral icterus or conjunctival erythema   Skin: no suspicious lesions or rash. No jaundice.   CV: " regular rhythm by palpation, 2+ distal pulses, no pedal edema    Resp: normal respiratory effort without conversational dyspnea   Psych: normal mood and affect    Gait: nonantalgic, appropriate coordination and balance   Neuro: normal light touch sensory exam of the extremities. Motor strength as noted below     Right Wrist and Hand exam    Inspection:       Swelling: lateral/dorsal hand with some bruising       No rotational or angular deformity appreciated    Tender:       Carpal and CMC joint of 4th and 5th digit(s)  right    Non Tender:       Remainder of the Wrist and Hand bilateral    ROM:       Decreased active and passive ROM of the 3rd, 4th and 5th MCP and PIP with flexion, extension and due to pain right    Strength:       Motor function intact    Neurovascular:       2+ radial pulses bilaterally with brisk capillary refill and      normal sensation to light touch in the radial, median and ulnar nerve distributions      Radiology:  Recent Results (from the past 744 hour(s))   XR Hand Right G/E 3 Views    Narrative    XR HAND RT G/E 3 VW 6/29/2018 2:42 PM    HISTORY: Right hand injury.    COMPARISON: None.    FINDINGS: Fracture through the base of the fifth metacarpal with  associated soft tissue swelling. Osseous structures otherwise appear  intact.      Impression    IMPRESSION: Fifth metacarpal base fracture.    MARTI NOLASCO MD         Assessment:  1. Closed displaced fracture of base of fifth metacarpal bone of right hand with routine healing, subsequent encounter    2. Injury of right hand, initial encounter        Plan:  Discussed the assessment with the patient.  Follow up: next week with hand surgeon to discuss further tx options  Volar splint and brace provided for protection   RICE reviewed  Given 5th proximal MC fracture with possible intraarticular involvement and some displacement, will refer to surgeon to make final decision of continuing conservative care  Home handouts provided and  supportive care reviewed  All questions were answered today  Contact us with additional questions or concerns  Signs and sx of concern reviewed      Quincy Brody DO, RAI  Primary Care Sports Medicine  [facility]             Disclaimer: This note consists of symbols derived from keyboarding, dictation and/or voice recognition software. As a result, there may be errors in the script that have gone undetected. Please consider this when interpreting information found in this chart.      Again, thank you for allowing me to participate in the care of your patient.        Sincerely,        Quincy Brody DO

## 2018-06-29 NOTE — PROGRESS NOTES
"Mateo Carpenter  :  1995  DOS: 2018  MRN: [id number]    Sports Medicine Clinic Visit    PCP: Pee Pino    Mateo Carpenter is a 23 year old Right hand dominant male who is seen in consultation at the request of  Lilliam Sandoval PA-C presenting with right hand injury. Patient hit a shed last night. Presents with hand wrapped in an ACE bandage with volar splint.     Injury: 1 day(s).  Pain located over right medial edge of hand, nonradiating.  Additional Features:  Positive: swelling.  Symptoms are better with Ice and Rest.  Symptoms are worse with: gripping, rotation of hand, ADLs.  Other evaluation and/or treatments so far consists of: Nothing.  Recent imaging completed: X-rays completed 2018.  Prior History of related problems: none    Social History:  at [employer]    Review of Systems  Musculoskeletal: as above  Remainder of review of systems is negative including constitutional, CV, pulmonary, GI, Skin and Neurologic except as noted in HPI or medical history.    Past Medical History:   Diagnosis Date     Allergic rhinitis due to animal dander 3/99 skin tests pos. for cat/dog only--tests per Dr. Matos     Asthma, mild persistent      Diagnostic skin and sensitization tests 3/99 skin tests pos. for cat/dog only     Obesity (BMI 30.0-34.9)      No past surgical history on file.    Objective  /82 (BP Location: Right arm, Patient Position: Chair, Cuff Size: Adult Large)  Pulse 91  Ht 5' 9\" (1.753 m)  Wt 247 lb (112 kg)  BMI 36.48 kg/m2    General: healthy, alert and in no distress    HEENT: no scleral icterus or conjunctival erythema   Skin: no suspicious lesions or rash. No jaundice.   CV: regular rhythm by palpation, 2+ distal pulses, no pedal edema    Resp: normal respiratory effort without conversational dyspnea   Psych: normal mood and affect    Gait: nonantalgic, appropriate coordination and balance   Neuro: normal light touch sensory exam of the " extremities. Motor strength as noted below     Right Wrist and Hand exam    Inspection:       Swelling: lateral/dorsal hand with some bruising       No rotational or angular deformity appreciated    Tender:       Carpal and CMC joint of 4th and 5th digit(s)  right    Non Tender:       Remainder of the Wrist and Hand bilateral    ROM:       Decreased active and passive ROM of the 3rd, 4th and 5th MCP and PIP with flexion, extension and due to pain right    Strength:       Motor function intact    Neurovascular:       2+ radial pulses bilaterally with brisk capillary refill and      normal sensation to light touch in the radial, median and ulnar nerve distributions      Radiology:  Recent Results (from the past 744 hour(s))   XR Hand Right G/E 3 Views    Narrative    XR HAND RT G/E 3 VW 6/29/2018 2:42 PM    HISTORY: Right hand injury.    COMPARISON: None.    FINDINGS: Fracture through the base of the fifth metacarpal with  associated soft tissue swelling. Osseous structures otherwise appear  intact.      Impression    IMPRESSION: Fifth metacarpal base fracture.    MARTI NOLASCO MD         Assessment:  1. Closed displaced fracture of base of fifth metacarpal bone of right hand with routine healing, subsequent encounter    2. Injury of right hand, initial encounter        Plan:  Discussed the assessment with the patient.  Follow up: next week with hand surgeon to discuss further tx options  Volar splint and brace provided for protection   RICE reviewed  Given 5th proximal MC fracture with possible intraarticular involvement and some displacement, will refer to surgeon to make final decision of continuing conservative care  Home handouts provided and supportive care reviewed  All questions were answered today  Contact us with additional questions or concerns  Signs and sx of concern reviewed      Quincy Brody DO, CAQ  Primary Care Sports Medicine  [facility]             Disclaimer: This note  consists of symbols derived from keyboarding, dictation and/or voice recognition software. As a result, there may be errors in the script that have gone undetected. Please consider this when interpreting information found in this chart.

## 2018-06-29 NOTE — NURSING NOTE
"Chief Complaint   Patient presents with     Musculoskeletal Problem     Health Maintenance     PHQ2, ACT       Initial /78  Pulse 67  Temp 99.7  F (37.6  C) (Oral)  Resp 18  Wt 237 lb (107.5 kg)  SpO2 98%  BMI 34.5 kg/m2 Estimated body mass index is 34.5 kg/(m^2) as calculated from the following:    Height as of 3/29/17: 5' 9.5\" (1.765 m).    Weight as of this encounter: 237 lb (107.5 kg).  Medication Reconciliation: complete  Marcella Art CMA    "

## 2018-06-29 NOTE — PROGRESS NOTES
SUBJECTIVE:     HPI  Mateo Carpenter is a 23 year old male who presents to clinic today for the following health issues:  Musculoskeletal problem/pain    Duration: last night.  Sustained a R hand injury after punching a wall last night.  States that he had been drinking and was messing around when this occurred.    Description  Location: right hand, throbbing pain    Intensity:  moderate    Accompanying signs and symptoms: No radicular pain, numbness, tingling or weakness.  Reports swelling and redness but no drainage or fevers.  R hand dominant.    History  Previous similar problem: no   Previous evaluation:  none    Precipitating or alleviating factors:  Trauma or overuse: YES- punched a wall  Aggravating factors include: worse with gripping/using hand, use and movement and relieved with rest.    Therapies tried and outcome: rest/inactivity without any relief      Reviewed PMH.  Patient Active Problem List   Diagnosis     Allergic rhinitis due to animal dander     Diagnostic skin and sensitization tests     Vitamin D deficiency     Obesity (BMI 30.0-34.9)     Mild persistent asthma without complication     Current Outpatient Prescriptions   Medication Sig Dispense Refill     cholecalciferol (VITAMIN D) 1000 UNIT tablet Take by mouth 2 times daily       Omega-3 Fatty Acids (FISH OIL PO)        albuterol (PROAIR HFA, PROVENTIL HFA, VENTOLIN HFA) 108 (90 BASE) MCG/ACT inhaler Inhale 2 puffs into the lungs every 4 hours as needed for shortness of breath / dyspnea (Patient not taking: Reported on 6/29/2018) 1 Inhaler 2     fluticasone (FLONASE) 50 MCG/ACT nasal spray Spray 2 sprays into both nostrils daily (Patient not taking: Reported on 6/29/2018) 1 Package 11     fluticasone-salmeterol (ADVAIR DISKUS) 100-50 MCG/DOSE diskus inhaler Inhale 1 puff into the lungs 2 times daily (Patient not taking: Reported on 6/29/2018) 1 Inhaler 4     montelukast (SINGULAIR) 10 MG tablet Take 1 tablet (10 mg) by mouth At Bedtime  (Patient not taking: Reported on 6/29/2018) 30 tablet 11     No Known Allergies    Review of Systems   Constitutional: Negative.  Negative for diaphoresis, fever and weight loss.   HENT: Negative.    Eyes: Negative for pain.   Respiratory: Negative.  Negative for cough and hemoptysis.    Cardiovascular: Negative.  Negative for chest pain and palpitations.   Musculoskeletal: Positive for joint pain.   Skin: Negative.    All other systems reviewed and are negative.      /78  Pulse 67  Temp 99.7  F (37.6  C) (Oral)  Resp 18  Wt 237 lb (107.5 kg)  SpO2 98%  BMI 34.5 kg/m2  Physical Exam   Constitutional: He is oriented to person, place, and time and well-developed, well-nourished, and in no distress. No distress.   Musculoskeletal:        Right wrist: He exhibits normal range of motion, no tenderness, no bony tenderness, no swelling, no effusion, no crepitus, no deformity and no laceration.        Right hand: He exhibits decreased range of motion, tenderness, bony tenderness (over the 4th and 5th metacarpals with bony deformity and swelling), deformity and swelling. He exhibits normal two-point discrimination, normal capillary refill and no laceration. Normal sensation noted. Normal strength noted.        Left hand: Normal. He exhibits normal range of motion, no bony tenderness, normal two-point discrimination, normal capillary refill, no deformity and no swelling. Normal sensation noted. Normal strength noted.   Neurological: He is alert and oriented to person, place, and time. He has normal sensation, normal strength and normal reflexes.   Skin: Skin is warm, dry and intact.   Distal pulses are 2+ and symmetric.  No peripheral edema.   Nursing note and vitals reviewed.  3V of R hand:  Displaced fx at the base of the 5th metacarpal. No dislocations.  Soft tissue swelling but no masses.  Will send for overread.        Assessment/Plan:  Closed nondisplaced fracture of base of fifth metacarpal bone of right  hand, initial encounter:  Neurovascularly intact.  He was placed in a volar splint and will send to [facility] at [place] today for further evaluation and management as he may require surgical intervention.  Recommend RICE and ibuprofen prn pain.  Recheck in clinic if symptoms worsen or if symptoms do not improve.   -     XR Hand Right G/E 3 Views  -     ORTHO  REFERRAL    Hand injury, right, initial encounter          Lilliam See MARYLIN SandovalC

## 2018-06-29 NOTE — MR AVS SNAPSHOT
After Visit Summary   6/29/2018    Mateo Carpenter    MRN: [id number]           Patient Information     Date Of Birth          1995        Visit Information        Provider Department      6/29/2018 2:20 PM Lilliam Sandoval PA-C [facility]        Today's Diagnoses     Hand injury, right, initial encounter    -  1       Follow-ups after your visit        Additional Services     ORTHO  REFERRAL       [facility] is referring you to the Orthopedic  Services at [facility].       The  Representative will assist you in the coordination of your Orthopedic and Musculoskeletal Care as prescribed by your physician.    The  Representative will call you within 1 business day to help schedule your appointment, or you may contact the  Representative at:    All areas ~ [phone]     Type of Referral : Non Surgical       Timeframe requested: 1 - 2 days    Coverage of these services is subject to the terms and limitations of your health insurance plan.  Please call member services at your health plan with any benefit or coverage questions.      If X-rays, CT or MRI's have been performed, please contact the facility where they were done to arrange for , prior to your scheduled appointment.  Please bring this referral request to your appointment and present it to your specialist.                  Who to contact     If you have questions or need follow up information about today's clinic visit or your schedule please contact [facility] directly at [phone].  Normal or non-critical lab and imaging results will be communicated to you by MyChart, letter or phone within 4 business days after the clinic has received the results. If you do not hear from us within 7 days, please contact the clinic through MyChart or phone. If you have a critical or abnormal lab result, we will notify you by phone  as soon as possible.  Submit refill requests through [employer] or call your pharmacy and they will forward the refill request to us. Please allow 3 business days for your refill to be completed.          Additional Information About Your Visit        Care EveryWhere ID     This is your Care EveryWhere ID. This could be used by other organizations to access your [facility] medical records  [id number]        Your Vitals Were     Pulse Temperature Respirations Pulse Oximetry BMI (Body Mass Index)       67 99.7  F (37.6  C) (Oral) 18 98% 34.5 kg/m2        Blood Pressure from Last 3 Encounters:   06/29/18 136/78   03/29/17 129/62   03/01/17 139/68    Weight from Last 3 Encounters:   06/29/18 237 lb (107.5 kg)   03/29/17 237 lb (107.5 kg)   03/01/17 234 lb (106.1 kg)              We Performed the Following     ORTHO  REFERRAL     XR Hand Right G/E 3 Views        Primary Care Provider Office Phone # Fax #    Pee Pino [phone] [address] [facility] [address]        Equal Access to Services     [facility] : Hadii aad ku hadasho Socasey, waaxda luqadaha, qaybta kaalmada shraddha, princess olvera . So [facility] [phone].    ATENCIÓN: Si habla español, tiene a boyle disposición servicios gratuitos de asistencia lingüística. [facility] [phone].    We comply with applicable federal civil rights laws and [place] laws. We do not discriminate on the basis of race, color, national origin, age, disability, sex, sexual orientation, or gender identity.            Thank you!     Thank you for choosing [facility]  for your care. Our goal is always to provide you with excellent care. Hearing back from our patients is one way we can continue to improve our services. Please take a few minutes to complete the written survey that you may receive in the mail after your visit with us. Thank you!             Your Updated Medication List - Protect  others around you: Learn how to safely use, store and throw away your medicines at www.disposemymeds.org.          This list is accurate as of 6/29/18  2:46 PM.  Always use your most recent med list.                   Brand Name Dispense Instructions for use Diagnosis    albuterol 108 (90 Base) MCG/ACT Inhaler    PROAIR HFA/PROVENTIL HFA/VENTOLIN HFA    1 Inhaler    Inhale 2 puffs into the lungs every 4 hours as needed for shortness of breath / dyspnea    Asthma, mild persistent, uncomplicated       cholecalciferol 1000 UNIT tablet    vitamin D3     Take by mouth 2 times daily        FISH OIL PO           fluticasone 50 MCG/ACT spray    FLONASE    1 Package    Spray 2 sprays into both nostrils daily    Allergic rhinitis due to animal dander       fluticasone-salmeterol 100-50 MCG/DOSE diskus inhaler    ADVAIR DISKUS    1 Inhaler    Inhale 1 puff into the lungs 2 times daily    Asthma, mild persistent, uncomplicated       montelukast 10 MG tablet    SINGULAIR    30 tablet    Take 1 tablet (10 mg) by mouth At Bedtime    Asthma, mild persistent, uncomplicated

## 2018-07-05 ENCOUNTER — OFFICE VISIT (OUTPATIENT)
Dept: ORTHOPEDICS | Facility: CLINIC | Age: 23
End: 2018-07-05
Payer: COMMERCIAL

## 2018-07-05 ENCOUNTER — RADIANT APPOINTMENT (OUTPATIENT)
Dept: GENERAL RADIOLOGY | Facility: CLINIC | Age: 23
End: 2018-07-05
Attending: PHYSICIAN ASSISTANT
Payer: COMMERCIAL

## 2018-07-05 VITALS
SYSTOLIC BLOOD PRESSURE: 143 MMHG | RESPIRATION RATE: 18 BRPM | BODY MASS INDEX: 36.58 KG/M2 | HEART RATE: 76 BPM | DIASTOLIC BLOOD PRESSURE: 87 MMHG | WEIGHT: 247 LBS | HEIGHT: 69 IN

## 2018-07-05 DIAGNOSIS — S62.316A CLOSED DISPLACED FRACTURE OF BASE OF FIFTH METACARPAL BONE OF RIGHT HAND, INITIAL ENCOUNTER: ICD-10-CM

## 2018-07-05 DIAGNOSIS — S62.316A CLOSED DISPLACED FRACTURE OF BASE OF FIFTH METACARPAL BONE OF RIGHT HAND, INITIAL ENCOUNTER: Primary | ICD-10-CM

## 2018-07-05 PROCEDURE — 26600 TREAT METACARPAL FRACTURE: CPT | Mod: F9 | Performed by: ORTHOPAEDIC SURGERY

## 2018-07-05 PROCEDURE — 73130 X-RAY EXAM OF HAND: CPT | Mod: RT

## 2018-07-05 NOTE — PROGRESS NOTES
Applied a short arm cast to patient's right wrist with extension pressure on the head of the 5th metacarpal. Capillary refill was satisfactory. Educated patient on cast care and warning signs/symptoms.    MARYLIN BaptisteC  Supervising physician: Pee Chacon MD  Dept. of Orthopedics  [facility]

## 2018-07-05 NOTE — LETTER
7/5/2018         RE: Mateo Carpenter  [address] [address] [address]        Dear Colleague,    Thank you for referring your patient, Mateo Carpenter, to the [facility]. Please see a copy of my visit note below.    Applied a short arm cast to patient's right wrist with extension pressure on the head of the 5th metacarpal. Capillary refill was satisfactory. Educated patient on cast care and warning signs/symptoms.    Mayo Tolbert PA-C  Supervising physician: Pee Chacon MD  Dept. of Orthopedics  [facility]            SUBJECTIVE:  Mateo Carpenter is a 23 year old male who sustained a right hand injury 6/28/18. . Mechanism of injury: he punched a shed at home.. Immediate symptoms: immediate pain, immediate swelling, inability to use hand directly after injury. Symptoms have been improving since that time. He has a velcro splint in place with this.  Prior history of related problems: no prior problems with this area in the past.    Past Medical History:   Diagnosis Date     Allergic rhinitis due to animal dander 3/99 skin tests pos. for cat/dog only--tests per Dr. Matos     Asthma, mild persistent      Diagnostic skin and sensitization tests 3/99 skin tests pos. for cat/dog only     Obesity (BMI 30.0-34.9)        History reviewed. No pertinent surgical history.    Family History   Problem Relation Age of Onset     GASTROINTESTINAL DISEASE Mother      Fatty Liver      Asthma Father      Alzheimer Disease Maternal Grandmother      Dementia     Thyroid Disease Maternal Grandmother      Circulatory Maternal Grandfather      AAA     Respiratory Paternal Grandmother      emphezima        Social History     Social History     Marital status: Single     Spouse name: N/A     Number of children: N/A     Years of education: N/A     Occupational History     Not on file.     Social History Main Topics     Smoking status: Never Smoker     Smokeless tobacco: Never  "Used     Alcohol use No     Drug use: No     Sexual activity: No     Other Topics Concern     Not on file     Social History Narrative       Current Outpatient Prescriptions   Medication Sig Dispense Refill     albuterol (PROAIR HFA, PROVENTIL HFA, VENTOLIN HFA) 108 (90 BASE) MCG/ACT inhaler Inhale 2 puffs into the lungs every 4 hours as needed for shortness of breath / dyspnea 1 Inhaler 2     cholecalciferol (VITAMIN D) 1000 UNIT tablet Take by mouth 2 times daily       fluticasone (FLONASE) 50 MCG/ACT nasal spray Spray 2 sprays into both nostrils daily 1 Package 11     fluticasone-salmeterol (ADVAIR DISKUS) 100-50 MCG/DOSE diskus inhaler Inhale 1 puff into the lungs 2 times daily 1 Inhaler 4     montelukast (SINGULAIR) 10 MG tablet Take 1 tablet (10 mg) by mouth At Bedtime 30 tablet 11     Omega-3 Fatty Acids (FISH OIL PO)          No Known Allergies    REVIEW OF SYSTEMS:  CONSTITUTIONAL:  NEGATIVE for fever, chills, change in weight, not feeling tired  SKIN:  NEGATIVE for worrisome rashes, no skin lumps, no skin ulcers and no non-healing wounds  EYES:  NEGATIVE for vision changes or irritation.  ENT/MOUTH:  NEGATIVE.  No hearing loss, no hoarseness, no difficulty swallowing.  RESP:  NEGATIVE. No cough or shortness of breath.  BREAST:  NEGATIVE for masses, tenderness or discharge  CV:  NEGATIVE for chest pain, palpitations or peripheral edema  GI:  NEGATIVE for nausea, abdominal pain, heartburn, or change in bowel habits  :  Negative. No dysuria, no hematuria  MUSCULOSKELETAL:  See HPI above  NEURO:  NEGATIVE . No headaches, no dizziness,  no numbness  ENDOCRINE:  NEGATIVE for temperature intolerance, skin/hair changes  HEME/ALLERGY/IMMUNE:  NEGATIVE for bleeding problems  PSYCHIATRIC:  NEGATIVE. no anxiety, no depression.      Exam:  Vitals: /87  Pulse 76  Resp 18  Ht 1.753 m (5' 9\")  Wt 112 kg (247 lb)  BMI 36.48 kg/m2  BMI= Body mass index is 36.48 kg/(m^2).  Constitutional:  healthy, alert and no " distress  Neuro: Alert and Oriented x 3, Gait normal. Sensation grossly WNL.  Hand exam: soft tissue tenderness and swelling at the base of the right 5th metacarpal.  No rotational deformity.  He has full range of motion of the fingers.    X-ray: fracture of right 5th metacarpal base with intraarticular extension, but minimal displacement.  There is slight flexion deform.    ASSESSMENT:  hand fracture of right 5th metacarpal base.  With slight extension mold, this is acceptable position.  I also discussed closed reduction and internal fixation of fracture.    PLAN:  Short arm cast applied with extension to 5th metacarpal.  Return to clinic 4-5 weeks with x-ray out of cast.      Pee Chacon M.D.  Department of Orthopaedic Surgery  [facility]      Again, thank you for allowing me to participate in the care of your patient.        Sincerely,        Pee Chacon MD

## 2018-07-05 NOTE — MR AVS SNAPSHOT
After Visit Summary   2018    Mateo Carpenter    MRN: [id number]           Patient Information     Date Of Birth          1995        Visit Information        Provider Department      2018 3:00 PM Pee Chacon MD [facility]        Today's Diagnoses     Closed displaced fracture of base of fifth metacarpal bone of right hand, initial encounter    -  1      Care Instructions    Cast care instructions given to patient today includin.  Keep cast clean and dry:  When showering/bathing use plastic bag or other impervious barrier to keep cast dry.  Moisture from sweat is normal.  The more clean and dry you keep your cast the less it will itch and the less it will smell.  If the cast does get soaked, you can call to request a cast change, but realize it may be 1-2 days before the doctor's office can do a cast change.  You can also use a hair dryer on the low setting, but this will take several hours to dry completely.  If itching occurs, do not stick anything down the cast, as this may result in skin breakdown and infection.  You may use a can of compressed air (with no additives) to relieve itching.    2.  Elevate extremity / affected area to reduce swelling:  The cast will not expand and contract the same way the splint did, therefore it will be especially important to elevate the injured area above heart level to reduce swelling, especially during the next 24-48 hours.  If swelling continues and produces tingling and numbness that is not relieved by elevation, schedule an appointment with Dr. Pee Chacon's office, ER, or Urgent care to get cast adjusted.    3.  Make it comfortable:  Since the fiberglass on your cast may fray during application, feel free to make minor modifications to your cast to reduce the amount of irritation to your skin.  This is especially important around the thumb area of the cast.  It may be helpful to use a nail file or small shop  "file to smooth sharp areas on the cast.  You may also find it helpful to pad the base of the thumb with a band-aid or piece of tape.  You may not cut large sections off of your cast.   ** For additional questions call [phone]  **  ** PATIENT VERBALIZED UNDERSTANDING OF ABOVE INSTRUCTIONS  **            Follow-ups after your visit        Who to contact     If you have questions or need follow up information about today's clinic visit or your schedule please contact [facility] directly at [phone].  Normal or non-critical lab and imaging results will be communicated to you by MyChart, letter or phone within 4 business days after the clinic has received the results. If you do not hear from us within 7 days, please contact the clinic through MyChart or phone. If you have a critical or abnormal lab result, we will notify you by phone as soon as possible.  Submit refill requests through [employer] or call your pharmacy and they will forward the refill request to us. Please allow 3 business days for your refill to be completed.          Additional Information About Your Visit        Care EveryWhere ID     This is your Care EveryWhere ID. This could be used by other organizations to access your [place] medical records  [id number]        Your Vitals Were     Pulse Respirations Height BMI (Body Mass Index)          76 18 1.753 m (5' 9\") 36.48 kg/m2         Blood Pressure from Last 3 Encounters:   07/05/18 143/87   06/29/18 133/82   06/29/18 136/78    Weight from Last 3 Encounters:   07/05/18 112 kg (247 lb)   06/29/18 112 kg (247 lb)   06/29/18 107.5 kg (237 lb)               Primary Care Provider Office Phone # Fax #    Pee Pino [phone] [address] [facility] [address]        Equal Access to Services     DUONG PARKER AH: Hadii vinay dugano Socasey, waaxda luqadaha, qaybta kaalmada shraddha, princess guerrero. So [facility] " [phone].    ATENCIÓN: Si sandy gonzalez, tiene a boyle disposición servicios gratuitos de asistencia lingüística. Ashtyn wolf [phone].    We comply with applicable federal civil rights laws and [place] laws. We do not discriminate on the basis of race, color, national origin, age, disability, sex, sexual orientation, or gender identity.            Thank you!     Thank you for choosing [facility]  for your care. Our goal is always to provide you with excellent care. Hearing back from our patients is one way we can continue to improve our services. Please take a few minutes to complete the written survey that you may receive in the mail after your visit with us. Thank you!             Your Updated Medication List - Protect others around you: Learn how to safely use, store and throw away your medicines at www.disposemymeds.org.          This list is accurate as of 7/5/18  4:09 PM.  Always use your most recent med list.                   Brand Name Dispense Instructions for use Diagnosis    albuterol 108 (90 Base) MCG/ACT Inhaler    PROAIR HFA/PROVENTIL HFA/VENTOLIN HFA    1 Inhaler    Inhale 2 puffs into the lungs every 4 hours as needed for shortness of breath / dyspnea    Asthma, mild persistent, uncomplicated       cholecalciferol 1000 UNIT tablet    vitamin D3     Take by mouth 2 times daily        FISH OIL PO           fluticasone 50 MCG/ACT spray    FLONASE    1 Package    Spray 2 sprays into both nostrils daily    Allergic rhinitis due to animal dander       fluticasone-salmeterol 100-50 MCG/DOSE diskus inhaler    ADVAIR DISKUS    1 Inhaler    Inhale 1 puff into the lungs 2 times daily    Asthma, mild persistent, uncomplicated       montelukast 10 MG tablet    SINGULAIR    30 tablet    Take 1 tablet (10 mg) by mouth At Bedtime    Asthma, mild persistent, uncomplicated

## 2018-07-05 NOTE — PATIENT INSTRUCTIONS
Cast care instructions given to patient today includin.  Keep cast clean and dry:  When showering/bathing use plastic bag or other impervious barrier to keep cast dry.  Moisture from sweat is normal.  The more clean and dry you keep your cast the less it will itch and the less it will smell.  If the cast does get soaked, you can call to request a cast change, but realize it may be 1-2 days before the doctor's office can do a cast change.  You can also use a hair dryer on the low setting, but this will take several hours to dry completely.  If itching occurs, do not stick anything down the cast, as this may result in skin breakdown and infection.  You may use a can of compressed air (with no additives) to relieve itching.    2.  Elevate extremity / affected area to reduce swelling:  The cast will not expand and contract the same way the splint did, therefore it will be especially important to elevate the injured area above heart level to reduce swelling, especially during the next 24-48 hours.  If swelling continues and produces tingling and numbness that is not relieved by elevation, schedule an appointment with Dr. Pee Chacon's office, ER, or Urgent care to get cast adjusted.    3.  Make it comfortable:  Since the fiberglass on your cast may fray during application, feel free to make minor modifications to your cast to reduce the amount of irritation to your skin.  This is especially important around the thumb area of the cast.  It may be helpful to use a nail file or small shop file to smooth sharp areas on the cast.  You may also find it helpful to pad the base of the thumb with a band-aid or piece of tape.  You may not cut large sections off of your cast.   ** For additional questions call [phone]  **  ** PATIENT VERBALIZED UNDERSTANDING OF ABOVE INSTRUCTIONS  **

## 2018-07-06 NOTE — PROGRESS NOTES
SUBJECTIVE:  Mateo Carpenter is a 23 year old male who sustained a right hand injury 6/28/18. . Mechanism of injury: he punched a shed at home.. Immediate symptoms: immediate pain, immediate swelling, inability to use hand directly after injury. Symptoms have been improving since that time. He has a velcro splint in place with this.  Prior history of related problems: no prior problems with this area in the past.    Past Medical History:   Diagnosis Date     Allergic rhinitis due to animal dander 3/99 skin tests pos. for cat/dog only--tests per Dr. Matos     Asthma, mild persistent      Diagnostic skin and sensitization tests 3/99 skin tests pos. for cat/dog only     Obesity (BMI 30.0-34.9)        History reviewed. No pertinent surgical history.    Family History   Problem Relation Age of Onset     GASTROINTESTINAL DISEASE Mother      Fatty Liver      Asthma Father      Alzheimer Disease Maternal Grandmother      Dementia     Thyroid Disease Maternal Grandmother      Circulatory Maternal Grandfather      AAA     Respiratory Paternal Grandmother      emphezima        Social History     Social History     Marital status: Single     Spouse name: N/A     Number of children: N/A     Years of education: N/A     Occupational History     Not on file.     Social History Main Topics     Smoking status: Never Smoker     Smokeless tobacco: Never Used     Alcohol use No     Drug use: No     Sexual activity: No     Other Topics Concern     Not on file     Social History Narrative       Current Outpatient Prescriptions   Medication Sig Dispense Refill     albuterol (PROAIR HFA, PROVENTIL HFA, VENTOLIN HFA) 108 (90 BASE) MCG/ACT inhaler Inhale 2 puffs into the lungs every 4 hours as needed for shortness of breath / dyspnea 1 Inhaler 2     cholecalciferol (VITAMIN D) 1000 UNIT tablet Take by mouth 2 times daily       fluticasone (FLONASE) 50 MCG/ACT nasal spray Spray 2 sprays into both nostrils daily 1 Package 11      "fluticasone-salmeterol (ADVAIR DISKUS) 100-50 MCG/DOSE diskus inhaler Inhale 1 puff into the lungs 2 times daily 1 Inhaler 4     montelukast (SINGULAIR) 10 MG tablet Take 1 tablet (10 mg) by mouth At Bedtime 30 tablet 11     Omega-3 Fatty Acids (FISH OIL PO)          No Known Allergies    REVIEW OF SYSTEMS:  CONSTITUTIONAL:  NEGATIVE for fever, chills, change in weight, not feeling tired  SKIN:  NEGATIVE for worrisome rashes, no skin lumps, no skin ulcers and no non-healing wounds  EYES:  NEGATIVE for vision changes or irritation.  ENT/MOUTH:  NEGATIVE.  No hearing loss, no hoarseness, no difficulty swallowing.  RESP:  NEGATIVE. No cough or shortness of breath.  BREAST:  NEGATIVE for masses, tenderness or discharge  CV:  NEGATIVE for chest pain, palpitations or peripheral edema  GI:  NEGATIVE for nausea, abdominal pain, heartburn, or change in bowel habits  :  Negative. No dysuria, no hematuria  MUSCULOSKELETAL:  See HPI above  NEURO:  NEGATIVE . No headaches, no dizziness,  no numbness  ENDOCRINE:  NEGATIVE for temperature intolerance, skin/hair changes  HEME/ALLERGY/IMMUNE:  NEGATIVE for bleeding problems  PSYCHIATRIC:  NEGATIVE. no anxiety, no depression.      Exam:  Vitals: /87  Pulse 76  Resp 18  Ht 1.753 m (5' 9\")  Wt 112 kg (247 lb)  BMI 36.48 kg/m2  BMI= Body mass index is 36.48 kg/(m^2).  Constitutional:  healthy, alert and no distress  Neuro: Alert and Oriented x 3, Gait normal. Sensation grossly WNL.  Hand exam: soft tissue tenderness and swelling at the base of the right 5th metacarpal.  No rotational deformity.  He has full range of motion of the fingers.    X-ray: fracture of right 5th metacarpal base with intraarticular extension, but minimal displacement.  There is slight flexion deform.    ASSESSMENT:  hand fracture of right 5th metacarpal base.  With slight extension mold, this is acceptable position.  I also discussed closed reduction and internal fixation of fracture.    PLAN:  Short " arm cast applied with extension to 5th metacarpal.  Return to clinic 4-5 weeks with x-ray out of cast.      Pee Chacon M.D.  Department of Orthopaedic Surgery  [facility]

## 2018-08-13 ENCOUNTER — RADIANT APPOINTMENT (OUTPATIENT)
Dept: GENERAL RADIOLOGY | Facility: CLINIC | Age: 23
End: 2018-08-13
Attending: ORTHOPAEDIC SURGERY
Payer: COMMERCIAL

## 2018-08-13 ENCOUNTER — OFFICE VISIT (OUTPATIENT)
Dept: ORTHOPEDICS | Facility: CLINIC | Age: 23
End: 2018-08-13
Payer: COMMERCIAL

## 2018-08-13 VITALS
DIASTOLIC BLOOD PRESSURE: 82 MMHG | SYSTOLIC BLOOD PRESSURE: 135 MMHG | BODY MASS INDEX: 36.29 KG/M2 | WEIGHT: 245 LBS | RESPIRATION RATE: 12 BRPM | HEIGHT: 69 IN

## 2018-08-13 DIAGNOSIS — S62.309A CLOSED FRACTURE OF METACARPAL BONE: ICD-10-CM

## 2018-08-13 DIAGNOSIS — S62.346D CLOSED NONDISPLACED FRACTURE OF BASE OF FIFTH METACARPAL BONE OF RIGHT HAND WITH ROUTINE HEALING, SUBSEQUENT ENCOUNTER: Primary | ICD-10-CM

## 2018-08-13 PROCEDURE — 73130 X-RAY EXAM OF HAND: CPT | Mod: RT

## 2018-08-13 PROCEDURE — 99207 ZZC FRACTURE CARE IN GLOBAL PERIOD: CPT | Performed by: ORTHOPAEDIC SURGERY

## 2018-08-13 NOTE — PROGRESS NOTES
"SUBJECTIVE:  Mateo Carpenter is a 23 year old male seen in follow up of right 5th metacarpal base fracture from 6/28/18. . Mechanism of injury: he punched a shed at home.. Immediate symptoms: immediate pain, immediate swelling, inability to use hand directly after injury.  Prior history of related problems: no prior problems with this area in the past.  Cast is removed today.        Exam:  Vitals: /82 (BP Location: Left arm)  Resp 12  Ht 1.753 m (5' 9\")  Wt 111.1 kg (245 lb)  BMI 36.18 kg/m2  BMI= Body mass index is 36.18 kg/(m^2).  Constitutional:  healthy, alert and no distress  Neuro: Alert and Oriented x 3, Gait normal. Sensation grossly WNL.  Hand exam: no tenderness at metacarpal fracture.  No rotational deformity.  He has limited range of motion of the ring and small fingers.  Also stiff in wrist.    X-ray: fracture of right 5th metacarpal base with intraarticular extension, but minimal displacement.  Good healing seen.    ASSESSMENT:  hand fracture of right 5th metacarpal base healing well.  Has stiffness.    PLAN:  Discontinue cast.  Range of motion to fingers and wrist.  Avoid impact.  Return to clinic 3 weeks.      Pee Chacon M.D.  Department of Orthopaedic Surgery  [facility]    "

## 2018-08-13 NOTE — PROGRESS NOTES
Patient arrived with a short arm cast on right arm. Cast was removed with cast saw and skin cleaned with rubbing alcohol.    MARYLIN BaptisteC  Supervising physician: Pee Chacon MD  Dept. of Orthopedics  [facility]

## 2018-08-13 NOTE — MR AVS SNAPSHOT
"              After Visit Summary   8/13/2018    Mateo Carpenter    MRN: [id number]           Patient Information     Date Of Birth          1995        Visit Information        Provider Department      8/13/2018 8:30 AM Pee Chacon MD [facility]        Today's Diagnoses     Closed fracture of metacarpal bone    -  1      Care Instructions    Start range of motion to hand and wrist.  Avoid impact for 3 weeks.  Return to clinic 3 weeks.          Follow-ups after your visit        Who to contact     If you have questions or need follow up information about today's clinic visit or your schedule please contact [facility] directly at [phone].  Normal or non-critical lab and imaging results will be communicated to you by MyChart, letter or phone within 4 business days after the clinic has received the results. If you do not hear from us within 7 days, please contact the clinic through MyChart or phone. If you have a critical or abnormal lab result, we will notify you by phone as soon as possible.  Submit refill requests through [employer] or call your pharmacy and they will forward the refill request to us. Please allow 3 business days for your refill to be completed.          Additional Information About Your Visit        Care EveryWhere ID     This is your Care EveryWhere ID. This could be used by other organizations to access your [place] medical records  [id number]        Your Vitals Were     Respirations Height BMI (Body Mass Index)             12 1.753 m (5' 9\") 36.18 kg/m2          Blood Pressure from Last 3 Encounters:   08/13/18 135/82   07/05/18 143/87   06/29/18 133/82    Weight from Last 3 Encounters:   08/13/18 111.1 kg (245 lb)   07/05/18 112 kg (247 lb)   06/29/18 112 kg (247 lb)               Primary Care Provider Office Phone # Fax #    Pee Pino [phone] [address] [facility] [address]        Equal Access to " Services     [facility]: Hadii aad ku hadsimonalaly Irenacasey, waaxda luqadaha, qaybta kaalmada reeadelinasaida, princess olvera . So [facility] [phone].    ATENCIÓN: Si sandy gonzalez, tiene a boyle disposición servicios gratuitos de asistencia lingüística. Llame al [phone].    We comply with applicable federal civil rights laws and [place] laws. We do not discriminate on the basis of race, color, national origin, age, disability, sex, sexual orientation, or gender identity.            Thank you!     Thank you for choosing [facility]  for your care. Our goal is always to provide you with excellent care. Hearing back from our patients is one way we can continue to improve our services. Please take a few minutes to complete the written survey that you may receive in the mail after your visit with us. Thank you!             Your Updated Medication List - Protect others around you: Learn how to safely use, store and throw away your medicines at www.disposemymeds.org.          This list is accurate as of 8/13/18  8:51 AM.  Always use your most recent med list.                   Brand Name Dispense Instructions for use Diagnosis    albuterol 108 (90 Base) MCG/ACT inhaler    PROAIR HFA/PROVENTIL HFA/VENTOLIN HFA    1 Inhaler    Inhale 2 puffs into the lungs every 4 hours as needed for shortness of breath / dyspnea    Asthma, mild persistent, uncomplicated       cholecalciferol 1000 UNIT tablet    vitamin D3     Take by mouth 2 times daily        FISH OIL PO           fluticasone 50 MCG/ACT spray    FLONASE    1 Package    Spray 2 sprays into both nostrils daily    Allergic rhinitis due to animal dander       fluticasone-salmeterol 100-50 MCG/DOSE diskus inhaler    ADVAIR DISKUS    1 Inhaler    Inhale 1 puff into the lungs 2 times daily    Asthma, mild persistent, uncomplicated       montelukast 10 MG tablet    SINGULAIR    30 tablet    Take 1 tablet (10 mg) by mouth At Bedtime    Asthma,  mild persistent, uncomplicated

## 2018-08-13 NOTE — LETTER
"    8/13/2018         RE: Mateo Carpenter  [address] [address]        Dear Colleague,    Thank you for referring your patient, Mateo Carpenter, to the [facility]. Please see a copy of my visit note below.    Patient arrived with a short arm cast on right arm. Cast was removed with cast saw and skin cleaned with rubbing alcohol.    Mayo Tolbert PA-C  Supervising physician: Pee Chacon MD  Dept. of Orthopedics  [facility]            SUBJECTIVE:  Mateo Carpenter is a 23 year old male seen in follow up of right 5th metacarpal base fracture from 6/28/18. . Mechanism of injury: he punched a shed at home.. Immediate symptoms: immediate pain, immediate swelling, inability to use hand directly after injury.  Prior history of related problems: no prior problems with this area in the past.  Cast is removed today.        Exam:  Vitals: /82 (BP Location: Left arm)  Resp 12  Ht 1.753 m (5' 9\")  Wt 111.1 kg (245 lb)  BMI 36.18 kg/m2  BMI= Body mass index is 36.18 kg/(m^2).  Constitutional:  healthy, alert and no distress  Neuro: Alert and Oriented x 3, Gait normal. Sensation grossly WNL.  Hand exam: no tenderness at metacarpal fracture.  No rotational deformity.  He has limited range of motion of the ring and small fingers.  Also stiff in wrist.    X-ray: fracture of right 5th metacarpal base with intraarticular extension, but minimal displacement.  Good healing seen.    ASSESSMENT:  hand fracture of right 5th metacarpal base healing well.  Has stiffness.    PLAN:  Discontinue cast.  Range of motion to fingers and wrist.  Avoid impact.  Return to clinic 3 weeks.      Pee Chacon M.D.  Department of Orthopaedic Surgery  [facility]      Again, thank you for allowing me to participate in the care of your patient.        Sincerely,        Pee Chacon MD    "

## 2018-10-29 ENCOUNTER — OFFICE VISIT (OUTPATIENT)
Dept: ORTHOPEDICS | Facility: CLINIC | Age: 23
End: 2018-10-29
Payer: COMMERCIAL

## 2018-10-29 VITALS
DIASTOLIC BLOOD PRESSURE: 82 MMHG | BODY MASS INDEX: 36.29 KG/M2 | HEART RATE: 101 BPM | OXYGEN SATURATION: 96 % | WEIGHT: 245 LBS | RESPIRATION RATE: 13 BRPM | HEIGHT: 69 IN | SYSTOLIC BLOOD PRESSURE: 133 MMHG

## 2018-10-29 DIAGNOSIS — S62.346D CLOSED NONDISPLACED FRACTURE OF BASE OF FIFTH METACARPAL BONE OF RIGHT HAND WITH ROUTINE HEALING, SUBSEQUENT ENCOUNTER: Primary | ICD-10-CM

## 2018-10-29 PROCEDURE — 99207 ZZC FRACTURE CARE IN GLOBAL PERIOD: CPT | Performed by: ORTHOPAEDIC SURGERY

## 2018-10-29 NOTE — PROGRESS NOTES
"SUBJECTIVE:  Mateo Carpenter is a 23 year old male seen in follow up of right 5th metacarpal base fracture from 6/28/18. . Mechanism of injury: he punched a shed at home.. Immediate symptoms: immediate pain, immediate swelling, inability to use hand directly after injury.  Prior history of related problems: no prior problems with this area in the past.  Cast was removed 8/13/18.  He has worked on range of motion.  He is still having some pain with use.        Exam:  Vitals: /82  Pulse 101  Resp 13  Ht 1.753 m (5' 9\")  Wt 111.1 kg (245 lb)  SpO2 96%  BMI 36.18 kg/m2  BMI= Body mass index is 36.18 kg/(m^2).  Constitutional:  healthy, alert and no distress  Neuro: Alert and Oriented x 3, Gait normal. Sensation grossly WNL.  Hand exam: no tenderness at metacarpal fracture.  No pain with longitudinal force through the metacarpal.   No rotational deformity.  He full range of motion of the ring and small fingers.  Wrist has full range of motion without pain.        ASSESSMENT:  hand fracture of right 5th metacarpal base healing well.  Mild residual pain with forceful .  I explained that full recovery often takes many months.  Function is good now.    PLAN:  Resume activity as tolerated.  Only limit activity by pain.        Pee Chacon M.D.  Department of Orthopaedic Surgery  [facility]    "

## 2018-10-29 NOTE — LETTER
"    10/29/2018         RE: Mateo Carpenter  [address] [address]        Dear Colleague,    Thank you for referring your patient, Mateo Carpenter, to the [facility]. Please see a copy of my visit note below.    SUBJECTIVE:  Mateo Carpenter is a 23 year old male seen in follow up of right 5th metacarpal base fracture from 6/28/18. . Mechanism of injury: he punched a shed at home.. Immediate symptoms: immediate pain, immediate swelling, inability to use hand directly after injury.  Prior history of related problems: no prior problems with this area in the past.  Cast was removed 8/13/18.  He has worked on range of motion.  He is still having some pain with use.        Exam:  Vitals: /82  Pulse 101  Resp 13  Ht 1.753 m (5' 9\")  Wt 111.1 kg (245 lb)  SpO2 96%  BMI 36.18 kg/m2  BMI= Body mass index is 36.18 kg/(m^2).  Constitutional:  healthy, alert and no distress  Neuro: Alert and Oriented x 3, Gait normal. Sensation grossly WNL.  Hand exam: no tenderness at metacarpal fracture.  No pain with longitudinal force through the metacarpal.   No rotational deformity.  He full range of motion of the ring and small fingers.  Wrist has full range of motion without pain.        ASSESSMENT:  hand fracture of right 5th metacarpal base healing well.  Mild residual pain with forceful .  I explained that full recovery often takes many months.  Function is good now.    PLAN:  Resume activity as tolerated.  Only limit activity by pain.        Pee Chacon M.D.  Department of Orthopaedic Surgery  [facility]      Again, thank you for allowing me to participate in the care of your patient.        Sincerely,        Pee Chacon MD    "

## 2018-10-29 NOTE — MR AVS SNAPSHOT
"              After Visit Summary   10/29/2018    Mateo Carpenter    MRN: [id number]           Patient Information     Date Of Birth          1995        Visit Information        Provider Department      10/29/2018 8:00 AM Pee Chacon MD [facility]        Today's Diagnoses     Closed nondisplaced fracture of base of fifth metacarpal bone of right hand with routine healing, subsequent encounter    -  1       Follow-ups after your visit        Who to contact     If you have questions or need follow up information about today's clinic visit or your schedule please contact [facility] directly at [phone].  Normal or non-critical lab and imaging results will be communicated to you by MyChart, letter or phone within 4 business days after the clinic has received the results. If you do not hear from us within 7 days, please contact the clinic through MyChart or phone. If you have a critical or abnormal lab result, we will notify you by phone as soon as possible.  Submit refill requests through [employer] or call your pharmacy and they will forward the refill request to us. Please allow 3 business days for your refill to be completed.          Additional Information About Your Visit        Care EveryWhere ID     This is your Care EveryWhere ID. This could be used by other organizations to access your [place] medical records  [id number]        Your Vitals Were     Pulse Respirations Height Pulse Oximetry BMI (Body Mass Index)       101 13 1.753 m (5' 9\") 96% 36.18 kg/m2        Blood Pressure from Last 3 Encounters:   10/29/18 133/82   08/13/18 135/82   07/05/18 143/87    Weight from Last 3 Encounters:   10/29/18 111.1 kg (245 lb)   08/13/18 111.1 kg (245 lb)   07/05/18 112 kg (247 lb)              Today, you had the following     No orders found for display       Primary Care Provider Office Phone # Fax #    Pee Pino [phone] [address] [facility] [address]        Equal Access to Services     [facility] : Hadii vinay cazares kaileelaly Floyd, waaxda luqadaha, qaybta kanicolesaida llanes, princess jassosandeepargenis guerrero. So [facility] [phone].    ATENCIÓN: Si habla español, tiene a boyle disposición servicios gratuitos de asistencia lingüística. [facility] [phone].    We comply with applicable federal civil rights laws and [place] laws. We do not discriminate on the basis of race, color, national origin, age, disability, sex, sexual orientation, or gender identity.            Thank you!     Thank you for choosing [facility]  for your care. Our goal is always to provide you with excellent care. Hearing back from our patients is one way we can continue to improve our services. Please take a few minutes to complete the written survey that you may receive in the mail after your visit with us. Thank you!             Your Updated Medication List - Protect others around you: Learn how to safely use, store and throw away your medicines at www.disposemymeds.org.          This list is accurate as of 10/29/18 12:18 PM.  Always use your most recent med list.                   Brand Name Dispense Instructions for use Diagnosis    albuterol 108 (90 Base) MCG/ACT inhaler    PROAIR HFA/PROVENTIL HFA/VENTOLIN HFA    1 Inhaler    Inhale 2 puffs into the lungs every 4 hours as needed for shortness of breath / dyspnea    Asthma, mild persistent, uncomplicated       cholecalciferol 1000 UNIT tablet    vitamin D3     Take by mouth 2 times daily        FISH OIL PO           fluticasone 50 MCG/ACT spray    FLONASE    1 Package    Spray 2 sprays into both nostrils daily    Allergic rhinitis due to animal dander       fluticasone-salmeterol 100-50 MCG/DOSE diskus inhaler    ADVAIR DISKUS    1 Inhaler    Inhale 1 puff into the lungs 2 times daily    Asthma, mild persistent, uncomplicated       montelukast 10 MG tablet    SINGULAIR    30 tablet    Take 1  tablet (10 mg) by mouth At Bedtime    Asthma, mild persistent, uncomplicated

## 2019-01-09 ENCOUNTER — OFFICE VISIT (OUTPATIENT)
Dept: ORTHOPEDICS | Facility: CLINIC | Age: 24
End: 2019-01-09
Payer: COMMERCIAL

## 2019-01-09 VITALS
SYSTOLIC BLOOD PRESSURE: 126 MMHG | DIASTOLIC BLOOD PRESSURE: 78 MMHG | HEIGHT: 69 IN | WEIGHT: 245 LBS | BODY MASS INDEX: 36.29 KG/M2

## 2019-01-09 DIAGNOSIS — S29.011A STRAIN OF LEFT PECTORALIS MUSCLE, INITIAL ENCOUNTER: Primary | ICD-10-CM

## 2019-01-09 PROCEDURE — 99213 OFFICE O/P EST LOW 20 MIN: CPT | Performed by: PEDIATRICS

## 2019-01-09 ASSESSMENT — MIFFLIN-ST. JEOR: SCORE: 2106.69

## 2019-01-09 NOTE — PROGRESS NOTES
"Sports Medicine Clinic Visit    PCP: Pee Pino    Mateo Carpenter is a 23 year old male who is seen  as a self referral presenting with left sided pectoralis pain.  Pain began when doing bench press 1/7/19.  He felt a pull, tear in his pectoralis.   No pain in the anterior aspect of his shoulder, but did feel a pull into his posterior shoulder. During his bench press motion on the way down he felt a \"tearing\" like pain and noted some swelling. He proceeded to rack the weight by himself following injury. He iced same day in the evening. Currently he notes some slight improvement in pain. 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 He mentions that when lifting weights the day of injury that he seemed to be rushing his repetitions. Has a history of a RTC strain in that shoulder in the past (maybe one year ago also due to weight lifting injury. He notes associated symptoms of clicking however he has no pain with this previous injury). He feels he had swelling the first day, but has not been swollen since.  He has not noticed any deformity. Patient is right hand dominant.     Injury: bench press     Location of Pain: left pectoralis   Duration of Pain: 2 day(s)  Rating of Pain at worst: 3/10  Rating of Pain Currently: 0/10 while sitting   Symptoms are better with: Rest  Symptoms are worse with: lifting, stretching   Additional Features:   Positive: swelling   Negative: bruising, popping, grinding, catching, locking, instability, paresthesias, numbness, weakness, pain in other joints and systemic symptoms  Other evaluation and/or treatments so far consists of: Nothing  Prior History of related problems: denies     Social History: banker     Review of Systems  Musculoskeletal: as above  Remainder of review of systems is negative including constitutional, CV, pulmonary, GI, Skin and Neurologic except as noted in HPI or medical " "history.    This document serves as a record of the services and decisions personally performed and made by Quincy Martinez DO. It was created on his behalf by Jos Matthew, a trained medical scribe. The creation of this document is based on the provider's statements to the medical scribe.  Jos Matthew 9:16 AM January 9, 2019      Past Medical History:   Diagnosis Date     Allergic rhinitis due to animal dander 3/99 skin tests pos. for cat/dog only--tests per Dr. Matos     Asthma, mild persistent      Diagnostic skin and sensitization tests 3/99 skin tests pos. for cat/dog only     Obesity (BMI 30.0-34.9)      PSHx: noncontributory    Family History   Problem Relation Age of Onset     Gastrointestinal Disease Mother         Fatty Liver      Asthma Father      Alzheimer Disease Maternal Grandmother         Dementia     Thyroid Disease Maternal Grandmother      Circulatory Maternal Grandfather         AAA     Respiratory Paternal Grandmother         emphezima      Social History     Socioeconomic History     Marital status: Single     Spouse name: Not on file     Number of children: Not on file     Years of education: Not on file     Highest education level: Not on file   Social Needs     Financial resource strain: Not on file     Food insecurity - worry: Not on file     Food insecurity - inability: Not on file     Transportation needs - medical: Not on file     Transportation needs - non-medical: Not on file   Occupational History     Not on file   Tobacco Use     Smoking status: Never Smoker     Smokeless tobacco: Never Used   Substance and Sexual Activity     Alcohol use: No     Drug use: No     Sexual activity: No   Other Topics Concern     Parent/sibling w/ CABG, MI or angioplasty before 65F 55M? Not Asked   Social History Narrative     Not on file       Objective  /78   Ht 1.753 m (5' 9\")   Wt 111.1 kg (245 lb)   BMI 36.18 kg/m      GENERAL APPEARANCE: healthy, alert and no distress   GAIT: " NORMAL  SKIN: no suspicious lesions or rashes to visible skin   NEURO: Normal strength and tone, mentation intact and speech normal  PSYCH:  mentation appears normal and affect normal/bright  HEENT: no scleral icterus  CV: no extremity edema  RESP: nonlabored breathing    Exam:  Left shoulder:    Range of motion:  Flexion normal with no pain  Abduction normal  with no pain  Internal rotation mild pain with the left, grossly full  External rotation grossly full, mild pain end range     Strength:  Normal--shoulder flexion, horiz adduction, press motion, overhead press motion, abduction    Tenderness:  Lateral pectoralis major border    Nontender at shoulder.    No significant asymmetry visually or palpably    Skin:       well perfused       capillary refill brisk  No swelling or bruising  Linear stretch mima anterior axilla on left    Radiology:  None today    Assessment:  1. Strain of left pectoralis muscle, initial encounter         Plan:  Discussed the assessment with the patient.  Topical Treatments: Ice prn  Over the counter medication: Patient's preferred OTC medication prn  No imaging of the area required currently; grossly full function, no apparent defect. Does not fit with pec major tendon rupture.  Activity Modification: discussed what to alter/avoid; limit lifting currently until pain resolved, then gradual return  Rehab: Home Exercise Program reviewed.  Physical Therapy: offered.  Follow up: monitor course next 3-4 weeks with above. May call for PT referral if desired.  Questions answered. The patient indicates understanding of these issues and agrees with the plan.    Quincy Coyne DO, CAQ      The information in this document, created by the medical scribe for me, accurately reflects the services I personally performed and the decisions made by me. I have reviewed and approved this document for accuracy prior to leaving the patient care area.  January 9, 2019 9:16 AM    Disclaimer: This note  consists of symbols derived from keyboarding, dictation and/or voice recognition software. As a result, there may be errors in the script that have gone undetected. Please consider this when interpreting information found in this chart.

## 2019-01-09 NOTE — LETTER
"    1/9/2019         RE: Mateo Carpenter  [id number] [facility] [id number]        Dear Colleague,    Thank you for referring your patient, Mateo Carpenter, to the [facility]. Please see a copy of my visit note below.    Sports Medicine Clinic Visit    PCP: Pee Pino    Mateo Carpenter is a 23 year old male who is seen  as a self referral presenting with left sided pectoralis pain.  Pain began when doing bench press 1/7/19.  He felt a pull, tear in his pectoralis.   No pain in the anterior aspect of his shoulder, but did feel a pull into his posterior shoulder. During his bench press motion on the way down he felt a \"tearing\" like pain and noted some swelling. He proceeded to rack the weight by himself following injury. He iced same day in the evening. Currently he notes some slight improvement in pain. 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 He mentions that when lifting weights the day of injury that he seemed to be rushing his repetitions. Has a history of a RTC strain in that shoulder in the past (maybe one year ago also due to weight lifting injury. He notes associated symptoms of clicking however he has no pain with this previous injury). He feels he had swelling the first day, but has not been swollen since.  He has not noticed any deformity. Patient is right hand dominant.     Injury: bench press     Location of Pain: left pectoralis   Duration of Pain: 2 day(s)  Rating of Pain at worst: 3/10  Rating of Pain Currently: 0/10 while sitting   Symptoms are better with: Rest  Symptoms are worse with: lifting, stretching   Additional Features:   Positive: swelling   Negative: bruising, popping, grinding, catching, locking, instability, paresthesias, numbness, weakness, pain in other joints and systemic symptoms  Other evaluation and/or treatments so far consists of: " Nothing  Prior History of related problems: denies     Social History: banker     Review of Systems  Musculoskeletal: as above  Remainder of review of systems is negative including constitutional, CV, pulmonary, GI, Skin and Neurologic except as noted in HPI or medical history.    This document serves as a record of the services and decisions personally performed and made by Quincy Martinez DO. It was created on his behalf by Jos Matthew, a trained medical scribe. The creation of this document is based on the provider's statements to the medical scribe.  Jos Matthew 9:16 AM January 9, 2019      Past Medical History:   Diagnosis Date     Allergic rhinitis due to animal dander 3/99 skin tests pos. for cat/dog only--tests per Dr. Matos     Asthma, mild persistent      Diagnostic skin and sensitization tests 3/99 skin tests pos. for cat/dog only     Obesity (BMI 30.0-34.9)      PSHx: noncontributory    Family History   Problem Relation Age of Onset     Gastrointestinal Disease Mother         Fatty Liver      Asthma Father      Alzheimer Disease Maternal Grandmother         Dementia     Thyroid Disease Maternal Grandmother      Circulatory Maternal Grandfather         AAA     Respiratory Paternal Grandmother         emphezima      Social History     Socioeconomic History     Marital status: Single     Spouse name: Not on file     Number of children: Not on file     Years of education: Not on file     Highest education level: Not on file   Social Needs     Financial resource strain: Not on file     Food insecurity - worry: Not on file     Food insecurity - inability: Not on file     Transportation needs - medical: Not on file     Transportation needs - non-medical: Not on file   Occupational History     Not on file   Tobacco Use     Smoking status: Never Smoker     Smokeless tobacco: Never Used   Substance and Sexual Activity     Alcohol use: No     Drug use: No     Sexual activity: No   Other Topics Concern      "Parent/sibling w/ CABG, MI or angioplasty before 65F 55M? Not Asked   Social History Narrative     Not on file       Objective  /78   Ht 1.753 m (5' 9\")   Wt 111.1 kg (245 lb)   BMI 36.18 kg/m       GENERAL APPEARANCE: healthy, alert and no distress   GAIT: NORMAL  SKIN: no suspicious lesions or rashes to visible skin   NEURO: Normal strength and tone, mentation intact and speech normal  PSYCH:  mentation appears normal and affect normal/bright  HEENT: no scleral icterus  CV: no extremity edema  RESP: nonlabored breathing    Exam:  Left shoulder:    Range of motion:  Flexion normal with no pain  Abduction normal  with no pain  Internal rotation mild pain with the left, grossly full  External rotation grossly full, mild pain end range     Strength:  Normal--shoulder flexion, horiz adduction, press motion, overhead press motion, abduction    Tenderness:  Lateral pectoralis major border    Nontender at shoulder.    No significant asymmetry visually or palpably    Skin:       well perfused       capillary refill brisk  No swelling or bruising  Linear stretch mima anterior axilla on left    Radiology:  None today    Assessment:  1. Strain of left pectoralis muscle, initial encounter         Plan:  Discussed the assessment with the patient.  Topical Treatments: Ice prn  Over the counter medication: Patient's preferred OTC medication prn  No imaging of the area required currently; grossly full function, no apparent defect. Does not fit with pec major tendon rupture.  Activity Modification: discussed what to alter/avoid; limit lifting currently until pain resolved, then gradual return  Rehab: Home Exercise Program reviewed.  Physical Therapy: offered.  Follow up: monitor course next 3-4 weeks with above. May call for PT referral if desired.  Questions answered. The patient indicates understanding of these issues and agrees with the plan.    Quincy Coyne DO, CAQ      The information in this document, created " by the medical scribe for me, accurately reflects the services I personally performed and the decisions made by me. I have reviewed and approved this document for accuracy prior to leaving the patient care area.  January 9, 2019 9:16 AM    Disclaimer: This note consists of symbols derived from keyboarding, dictation and/or voice recognition software. As a result, there may be errors in the script that have gone undetected. Please consider this when interpreting information found in this chart.          Again, thank you for allowing me to participate in the care of your patient.        Sincerely,        Quincy Coyne, DO

## 2019-05-21 NOTE — PROGRESS NOTES
"  SUBJECTIVE:   CC: Mateo Carpenter is an 24 year old male who presents for preventive health visit.     Healthy Habits:    Do you get at least three servings of calcium containing foods daily (dairy, green leafy vegetables, etc.)? { :787960::\"yes\"}    Amount of exercise or daily activities, outside of work: { :307863}    Problems taking medications regularly { :541225::\"No\"}    Medication side effects: { :191363::\"No\"}    Have you had an eye exam in the past two years? { :949724}    Do you see a dentist twice per year? { :851985}    Do you have sleep apnea, excessive snoring or daytime drowsiness?{ :782067}  {Outside tests to abstract? :181513}    {additional problems to add (Optional):575335}    Today's PHQ-2 Score:   PHQ-2 ( 1999 [employer]) 6/29/2018 3/29/2017   Q1: Little interest or pleasure in doing things 0 0   Q2: Feeling down, depressed or hopeless 0 0   PHQ-2 Score 0 0     {PHQ-2 LOOK IN ASSESSMENTS (Optional) :674945}  Abuse: Current or Past(Physical, Sexual or Emotional)- {YES/NO/NA:334703}  Do you feel safe in your environment? {YES/NO/NA:794437}    Social History     Tobacco Use     Smoking status: Never Smoker     Smokeless tobacco: Never Used   Substance Use Topics     Alcohol use: No     If you drink alcohol do you typically have >3 drinks per day or >7 drinks per week? {ETOH :206004}                      Last PSA: No results found for: PSA    Reviewed orders with patient. Reviewed health maintenance and updated orders accordingly - {Yes/No:188383::\"Yes\"}  {Chronicprobdata (Optional):761297}    Reviewed and updated as needed this visit by clinical staff         Reviewed and updated as needed this visit by Provider        {HISTORY OPTIONS (Optional):998682}    ROS:  { :923504::\"CONSTITUTIONAL: NEGATIVE for fever, chills, change in weight\",\"INTEGUMENTARY/SKIN: NEGATIVE for worrisome rashes, moles or lesions\",\"EYES: NEGATIVE for vision changes or irritation\",\"ENT: NEGATIVE for ear, mouth and throat " "problems\",\"RESP: NEGATIVE for significant cough or SOB\",\"CV: NEGATIVE for chest pain, palpitations or peripheral edema\",\"GI: NEGATIVE for nausea, abdominal pain, heartburn, or change in bowel habits\",\" male: negative for dysuria, hematuria, decreased urinary stream, erectile dysfunction, urethral discharge\",\"MUSCULOSKELETAL: NEGATIVE for significant arthralgias or myalgia\",\"NEURO: NEGATIVE for weakness, dizziness or paresthesias\",\"PSYCHIATRIC: NEGATIVE for changes in mood or affect\"}    OBJECTIVE:   There were no vitals taken for this visit.  EXAM:  {Exam Choices:399994}    {Diagnostic Test Results (Optional):684125::\"Diagnostic Test Results:\",\"Labs reviewed in Epic\"}    ASSESSMENT/PLAN:   {Diag Picklist:943903}    COUNSELING:  {MALE COUNSELING MESSAGES:938487::\"Reviewed preventive health counseling, as reflected in patient instructions\"}    Estimated body mass index is 36.18 kg/m  as calculated from the following:    Height as of 1/9/19: 1.753 m (5' 9\").    Weight as of 1/9/19: 111.1 kg (245 lb).    {Weight Management Plan (ACO) Complete if BMI is abnormal-  Ages 18-64  BMI >24.9.  Age 65+ with BMI <23 or >30 (Optional):415054}     reports that he has never smoked. He has never used smokeless tobacco.  {Tobacco Cessation -- Complete if patient is a smoker (Optional):592592}    Counseling Resources:  ATP IV Guidelines  Pooled Cohorts Equation Calculator  FRAX Risk Assessment  ICSI Preventive Guidelines  Dietary Guidelines for Americans, 2010  USDA's MyPlate  ASA Prophylaxis  Lung CA Screening    Mateo Zapata PA-C  [facility]  "

## 2019-05-22 ENCOUNTER — OFFICE VISIT (OUTPATIENT)
Dept: FAMILY MEDICINE | Facility: CLINIC | Age: 24
End: 2019-05-22
Payer: COMMERCIAL

## 2019-05-22 VITALS
HEIGHT: 71 IN | OXYGEN SATURATION: 97 % | WEIGHT: 256 LBS | DIASTOLIC BLOOD PRESSURE: 84 MMHG | BODY MASS INDEX: 35.84 KG/M2 | RESPIRATION RATE: 16 BRPM | HEART RATE: 84 BPM | TEMPERATURE: 98.7 F | SYSTOLIC BLOOD PRESSURE: 136 MMHG

## 2019-05-22 DIAGNOSIS — Z11.3 SCREEN FOR STD (SEXUALLY TRANSMITTED DISEASE): ICD-10-CM

## 2019-05-22 DIAGNOSIS — R53.83 FATIGUE, UNSPECIFIED TYPE: ICD-10-CM

## 2019-05-22 DIAGNOSIS — Z00.00 ROUTINE GENERAL MEDICAL EXAMINATION AT A HEALTH CARE FACILITY: Primary | ICD-10-CM

## 2019-05-22 DIAGNOSIS — J45.20 MILD INTERMITTENT ASTHMA WITHOUT COMPLICATION: ICD-10-CM

## 2019-05-22 LAB
ALBUMIN SERPL-MCNC: 4 G/DL (ref 3.4–5)
ALP SERPL-CCNC: 66 U/L (ref 40–150)
ALT SERPL W P-5'-P-CCNC: 55 U/L (ref 0–70)
ANION GAP SERPL CALCULATED.3IONS-SCNC: 6 MMOL/L (ref 3–14)
AST SERPL W P-5'-P-CCNC: 24 U/L (ref 0–45)
BASOPHILS # BLD AUTO: 0 10E9/L (ref 0–0.2)
BASOPHILS NFR BLD AUTO: 0.4 %
BILIRUB SERPL-MCNC: 0.5 MG/DL (ref 0.2–1.3)
BUN SERPL-MCNC: 11 MG/DL (ref 7–30)
CALCIUM SERPL-MCNC: 9.3 MG/DL (ref 8.5–10.1)
CHLORIDE SERPL-SCNC: 108 MMOL/L (ref 94–109)
CHOLEST SERPL-MCNC: 192 MG/DL
CO2 SERPL-SCNC: 27 MMOL/L (ref 20–32)
CREAT SERPL-MCNC: 0.98 MG/DL (ref 0.66–1.25)
DIFFERENTIAL METHOD BLD: NORMAL
EOSINOPHIL # BLD AUTO: 0.2 10E9/L (ref 0–0.7)
EOSINOPHIL NFR BLD AUTO: 2.8 %
ERYTHROCYTE [DISTWIDTH] IN BLOOD BY AUTOMATED COUNT: 12.8 % (ref 10–15)
GFR SERPL CREATININE-BSD FRML MDRD: >90 ML/MIN/{1.73_M2}
GLUCOSE SERPL-MCNC: 94 MG/DL (ref 70–99)
HCT VFR BLD AUTO: 48.7 % (ref 40–53)
HDLC SERPL-MCNC: 46 MG/DL
HGB BLD-MCNC: 16.4 G/DL (ref 13.3–17.7)
LDLC SERPL CALC-MCNC: 125 MG/DL
LYMPHOCYTES # BLD AUTO: 2.5 10E9/L (ref 0.8–5.3)
LYMPHOCYTES NFR BLD AUTO: 34.6 %
MCH RBC QN AUTO: 30 PG (ref 26.5–33)
MCHC RBC AUTO-ENTMCNC: 33.7 G/DL (ref 31.5–36.5)
MCV RBC AUTO: 89 FL (ref 78–100)
MONOCYTES # BLD AUTO: 0.5 10E9/L (ref 0–1.3)
MONOCYTES NFR BLD AUTO: 6.9 %
NEUTROPHILS # BLD AUTO: 3.9 10E9/L (ref 1.6–8.3)
NEUTROPHILS NFR BLD AUTO: 55.3 %
NONHDLC SERPL-MCNC: 146 MG/DL
PLATELET # BLD AUTO: 255 10E9/L (ref 150–450)
POTASSIUM SERPL-SCNC: 4.5 MMOL/L (ref 3.4–5.3)
PROT SERPL-MCNC: 7.9 G/DL (ref 6.8–8.8)
RBC # BLD AUTO: 5.46 10E12/L (ref 4.4–5.9)
SODIUM SERPL-SCNC: 141 MMOL/L (ref 133–144)
TRIGL SERPL-MCNC: 105 MG/DL
TSH SERPL DL<=0.005 MIU/L-ACNC: 1.23 MU/L (ref 0.4–4)
WBC # BLD AUTO: 7.1 10E9/L (ref 4–11)

## 2019-05-22 PROCEDURE — 87389 HIV-1 AG W/HIV-1&-2 AB AG IA: CPT | Performed by: PHYSICIAN ASSISTANT

## 2019-05-22 PROCEDURE — 36415 COLL VENOUS BLD VENIPUNCTURE: CPT | Performed by: PHYSICIAN ASSISTANT

## 2019-05-22 PROCEDURE — 85025 COMPLETE CBC W/AUTO DIFF WBC: CPT | Performed by: PHYSICIAN ASSISTANT

## 2019-05-22 PROCEDURE — 99395 PREV VISIT EST AGE 18-39: CPT | Performed by: PHYSICIAN ASSISTANT

## 2019-05-22 PROCEDURE — 99213 OFFICE O/P EST LOW 20 MIN: CPT | Mod: 25 | Performed by: PHYSICIAN ASSISTANT

## 2019-05-22 PROCEDURE — 87591 N.GONORRHOEAE DNA AMP PROB: CPT | Performed by: PHYSICIAN ASSISTANT

## 2019-05-22 PROCEDURE — 80061 LIPID PANEL: CPT | Performed by: PHYSICIAN ASSISTANT

## 2019-05-22 PROCEDURE — 80053 COMPREHEN METABOLIC PANEL: CPT | Performed by: PHYSICIAN ASSISTANT

## 2019-05-22 PROCEDURE — 87491 CHLMYD TRACH DNA AMP PROBE: CPT | Performed by: PHYSICIAN ASSISTANT

## 2019-05-22 PROCEDURE — 86780 TREPONEMA PALLIDUM: CPT | Performed by: PHYSICIAN ASSISTANT

## 2019-05-22 PROCEDURE — 84443 ASSAY THYROID STIM HORMONE: CPT | Performed by: PHYSICIAN ASSISTANT

## 2019-05-22 PROCEDURE — 86803 HEPATITIS C AB TEST: CPT | Performed by: PHYSICIAN ASSISTANT

## 2019-05-22 PROCEDURE — 82306 VITAMIN D 25 HYDROXY: CPT | Performed by: PHYSICIAN ASSISTANT

## 2019-05-22 RX ORDER — MONTELUKAST SODIUM 10 MG/1
10 TABLET ORAL AT BEDTIME
Qty: 90 TABLET | Refills: 3 | Status: SHIPPED | OUTPATIENT
Start: 2019-05-22 | End: 2021-06-08

## 2019-05-22 RX ORDER — ALBUTEROL SULFATE 90 UG/1
2 AEROSOL, METERED RESPIRATORY (INHALATION) EVERY 4 HOURS PRN
Qty: 18 G | Refills: 1 | Status: SHIPPED | OUTPATIENT
Start: 2019-05-22 | End: 2020-12-01

## 2019-05-22 ASSESSMENT — ENCOUNTER SYMPTOMS
WEAKNESS: 0
HEMATURIA: 0
DIARRHEA: 0
HEADACHES: 0
PALPITATIONS: 0
NERVOUS/ANXIOUS: 0
DYSURIA: 0
ABDOMINAL PAIN: 0
SORE THROAT: 0
FREQUENCY: 0
CHILLS: 0
COUGH: 0
DIZZINESS: 0
HEARTBURN: 0
SHORTNESS OF BREATH: 0
PARESTHESIAS: 0
CONSTIPATION: 0
EYE PAIN: 0
ARTHRALGIAS: 0
FEVER: 0
HEMATOCHEZIA: 0
JOINT SWELLING: 0
NAUSEA: 0
MYALGIAS: 0

## 2019-05-22 ASSESSMENT — PATIENT HEALTH QUESTIONNAIRE - PHQ9
SUM OF ALL RESPONSES TO PHQ QUESTIONS 1-9: 13
10. IF YOU CHECKED OFF ANY PROBLEMS, HOW DIFFICULT HAVE THESE PROBLEMS MADE IT FOR YOU TO DO YOUR WORK, TAKE CARE OF THINGS AT HOME, OR GET ALONG WITH OTHER PEOPLE: VERY DIFFICULT
SUM OF ALL RESPONSES TO PHQ QUESTIONS 1-9: 13

## 2019-05-22 ASSESSMENT — MIFFLIN-ST. JEOR: SCORE: 2175.4

## 2019-05-22 NOTE — LETTER
My Asthma Action Plan  Name: Mateo Carpenter   YOB: 1995  Date: 5/21/2019   My doctor: Mateo Zapata PA-C   My clinic: [facility]        My Control Medicine: albuterol  My Rescue Medicine: albuterol   My Asthma Severity: intermittent  Avoid your asthma triggers: smoke, upper respiratory infections, dust mites and pollens               GREEN ZONE   Good Control    I feel good    No cough or wheeze    Can work, sleep and play without asthma symptoms       Take your asthma control medicine every day.     1. If exercise triggers your asthma, take your rescue medication    15 minutes before exercise or sports, and    During exercise if you have asthma symptoms  2. Spacer to use with inhaler: If you have a spacer, make sure to use it with your inhaler             YELLOW ZONE Getting Worse  I have ANY of these:    I do not feel good    Cough or wheeze    Chest feels tight    Wake up at night   1. Keep taking your Green Zone medications  2. Start taking your rescue medicine:    every 20 minutes for up to 1 hour. Then every 4 hours for 24-48 hours.  3. If you stay in the Yellow Zone for more than 12-24 hours, contact your doctor.  4. If you do not return to the Green Zone in 12-24 hours or you get worse, start taking your oral steroid medicine if prescribed by your provider.           RED ZONE Medical Alert - Get Help  I have ANY of these:    I feel awful    Medicine is not helping    Breathing getting harder    Trouble walking or talking    Nose opens wide to breathe       1. Take your rescue medicine NOW  2. If your provider has prescribed an oral steroid medicine, start taking it NOW  3. Call your doctor NOW  4. If you are still in the Red Zone after 20 minutes and you have not reached your doctor:    Take your rescue medicine again and    Call 911 or go to the emergency room right away    See your regular doctor within 2 weeks of an Emergency Room or Urgent Care visit for  follow-up treatment.          Annual Reminders:  Meet with Asthma Educator,  Flu Shot in the Fall, consider Pneumonia Vaccination for patients with asthma (aged 19 and older).    Pharmacy: [facility] [address] [place] [address] [address] [facility] [address]                      Asthma Triggers  How To Control Things That Make Your Asthma Worse    Triggers are things that make your asthma worse.  Look at the list below to help you find your triggers and what you can do about them.  You can help prevent asthma flare-ups by staying away from your triggers.      Trigger                                                          What you can do   Cigarette Smoke  Tobacco smoke can make asthma worse. Do not allow smoking in your home, car or around you.  Be sure no one smokes at a child s day care or school.  If you smoke, ask your health care provider for ways to help you quit.  Ask family members to quit too.  Ask your health care provider for a referral to Quit Plan to help you quit smoking, or call 8-695-839-PLAN.     Colds, Flu, Bronchitis  These are common triggers of asthma. Wash your hands often.  Don t touch your eyes, nose or mouth.  Get a flu shot every year.     Dust Mites  These are tiny bugs that live in cloth or carpet. They are too small to see. Wash sheets and blankets in hot water every week.   Encase pillows and mattress in dust mite proof covers.  Avoid having carpet if you can. If you have carpet, vacuum weekly.   Use a dust mask and HEPA vacuum.   Pollen and Outdoor Mold  Some people are allergic to trees, grass, or weed pollen, or molds. Try to keep your windows closed.  Limit time out doors when pollen count is high.   Ask you health care provider about taking medicine during allergy season.     Animal Dander  Some people are allergic to skin flakes, urine or saliva from pets with fur or feathers. Keep pets with fur or feathers out of your home.    If you can t keep the  pet outdoors, then keep the pet out of your bedroom.  Keep the bedroom door closed.  Keep pets off cloth furniture and away from stuffed toys.     Mice, Rats, and Cockroaches  Some people are allergic to the waste from these pests.   Cover food and garbage.  Clean up spills and food crumbs.  Store grease in the refrigerator.   Keep food out of the bedroom.   Indoor Mold  This can be a trigger if your home has high moisture. Fix leaking faucets, pipes, or other sources of water.   Clean moldy surfaces.  Dehumidify basement if it is damp and smelly.   Smoke, Strong Odors, and Sprays  These can reduce air quality. Stay away from strong odors and sprays, such as perfume, powder, hair spray, paints, smoke incense, paint, cleaning products, candles and new carpet.   Exercise or Sports  Some people with asthma have this trigger. Be active!  Ask your doctor about taking medicine before sports or exercise to prevent symptoms.    Warm up for 5-10 minutes before and after sports or exercise.     Other Triggers of Asthma  Cold air:  Cover your nose and mouth with a scarf.  Sometimes laughing or crying can be a trigger.  Some medicines and food can trigger asthma.

## 2019-05-22 NOTE — PROGRESS NOTES
SUBJECTIVE:   CC: Mateo Carpenter is an 24 year old male who presents for preventative health visit.     Healthy Habits:     Getting at least 3 servings of Calcium per day:  NO    Bi-annual eye exam:  NO    Dental care twice a year:  Yes    Sleep apnea or symptoms of sleep apnea:  Daytime drowsiness    Diet:  Regular (no restrictions)    Frequency of exercise:  2-3 days/week    Duration of exercise:  30-45 minutes    Taking medications regularly:  Yes    Medication side effects:  None    PHQ-2 Total Score: 3    Additional concerns today:  No      Fatigue for couple years, sleep well but still tired through the day   Gets about 8hrs of sleep. occ wakes up at night.   Will take a nap after work for about 1 hr.   Possible snores.     Asthma - discuss refilling/what asthma meds he should take- increase symptoms with being around animals. otherwise rarely needs medications. Exercises without symptoms.     See phq9 score    Today's PHQ-2 Score:   PHQ-2 ( 1999 Pf[employer]) 5/22/2019   Q1: Little interest or pleasure in doing things 1   Q2: Feeling down, depressed or hopeless 2   PHQ-2 Score 3   Q1: Little interest or pleasure in doing things Several days   Q2: Feeling down, depressed or hopeless More than half the days   PHQ-2 Score 3       Abuse: Current or Past(Physical, Sexual or Emotional)- No  Do you feel safe in your environment? Yes    Social History     Tobacco Use     Smoking status: Never Smoker     Smokeless tobacco: Never Used   Substance Use Topics     Alcohol use: Yes       Alcohol Use 5/22/2019   Prescreen: >3 drinks/day or >7 drinks/week? Yes   Prescreen: >3 drinks/day or >7 drinks/week? -   AUDIT SCORE  9     Last PSA: No results found for: PSA    Reviewed orders with patient. Reviewed health maintenance and updated orders accordingly - Yes  Lab work is in process  Labs reviewed in EPIC  BP Readings from Last 3 Encounters:   05/22/19 136/84   01/09/19 126/78   10/29/18 133/82    Wt Readings from Last 3  Encounters:   05/22/19 116.1 kg (256 lb)   01/09/19 111.1 kg (245 lb)   10/29/18 111.1 kg (245 lb)                  Patient Active Problem List   Diagnosis     Allergic rhinitis due to animal dander     Diagnostic skin and sensitization tests     Vitamin D deficiency     Obesity (BMI 30.0-34.9)     Closed fracture of metacarpal bone     BMI 36.0-36.9,adult     History reviewed. No pertinent surgical history.    Social History     Tobacco Use     Smoking status: Never Smoker     Smokeless tobacco: Never Used   Substance Use Topics     Alcohol use: Yes     Family History   Problem Relation Age of Onset     Gastrointestinal Disease Mother         Fatty Liver      Asthma Father      Alzheimer Disease Maternal Grandmother         Dementia     Thyroid Disease Maternal Grandmother      Circulatory Maternal Grandfather         AAA     Respiratory Paternal Grandmother         emphezima          Current Outpatient Medications   Medication Sig Dispense Refill     albuterol (PROAIR HFA/PROVENTIL HFA/VENTOLIN HFA) 108 (90 Base) MCG/ACT inhaler Inhale 2 puffs into the lungs every 4 hours as needed for shortness of breath / dyspnea 18 g 1     cholecalciferol (VITAMIN D) 1000 UNIT tablet Take by mouth 2 times daily       montelukast (SINGULAIR) 10 MG tablet Take 1 tablet (10 mg) by mouth At Bedtime 90 tablet 3     Omega-3 Fatty Acids (FISH OIL PO)        fluticasone (FLONASE) 50 MCG/ACT nasal spray Spray 2 sprays into both nostrils daily (Patient not taking: Reported on 5/22/2019) 1 Package 11     fluticasone-salmeterol (ADVAIR DISKUS) 100-50 MCG/DOSE diskus inhaler Inhale 1 puff into the lungs 2 times daily (Patient not taking: Reported on 5/22/2019) 1 Inhaler 4     No Known Allergies    Reviewed and updated as needed this visit by clinical staff  Tobacco  Allergies  Meds  Med Hx  Surg Hx  Fam Hx  Soc Hx        Reviewed and updated as needed this visit by Provider          Past Medical History:   Diagnosis Date     Allergic  "rhinitis due to animal dander 3/99 skin tests pos. for cat/dog only--tests per Dr. Matos     Asthma, mild persistent      Diagnostic skin and sensitization tests 3/99 skin tests pos. for cat/dog only     Obesity (BMI 30.0-34.9)       History reviewed. No pertinent surgical history.    Review of Systems   Constitutional: Negative for chills and fever.   HENT: Positive for ear pain. Negative for congestion, hearing loss and sore throat.    Eyes: Negative for pain and visual disturbance.   Respiratory: Negative for cough and shortness of breath.    Cardiovascular: Negative for chest pain, palpitations and peripheral edema.   Gastrointestinal: Negative for abdominal pain, constipation, diarrhea, heartburn, hematochezia and nausea.   Genitourinary: Negative for discharge, dysuria, frequency, genital sores, hematuria, impotence and urgency.   Musculoskeletal: Negative for arthralgias, joint swelling and myalgias.   Skin: Negative for rash.   Neurological: Negative for dizziness, weakness, headaches and paresthesias.   Psychiatric/Behavioral: Negative for mood changes. The patient is not nervous/anxious.        OBJECTIVE:   /84   Pulse 84   Temp 98.7  F (37.1  C) (Oral)   Resp 16   Ht 1.791 m (5' 10.5\")   Wt 116.1 kg (256 lb)   SpO2 97%   BMI 36.21 kg/m      Physical Exam  GENERAL: healthy, alert and no distress  EYES: Eyes grossly normal to inspection, PERRL and conjunctivae and sclerae normal  HENT: ear canals and TM's normal, nose and mouth without ulcers or lesions  NECK: no adenopathy, no asymmetry, masses, or scars and thyroid normal to palpation  RESP: lungs clear to auscultation - no rales, rhonchi or wheezes  CV: regular rate and rhythm, normal S1 S2, no S3 or S4, no murmur, click or rub, no peripheral edema and peripheral pulses strong  ABDOMEN: soft, nontender, no hepatosplenomegaly, no masses and bowel sounds normal   (male): normal male genitalia without lesions or urethral discharge, no " hernia  MS: no gross musculoskeletal defects noted, no edema  SKIN: no suspicious lesions or rashes  NEURO: Normal strength and tone, mentation intact and speech normal  PSYCH: mentation appears normal, affect normal/bright    Diagnostic Test Results:  Labs reviewed in Epic  No results found for this or any previous visit (from the past 24 hour(s)).    ASSESSMENT/PLAN:       ICD-10-CM    1. Routine general medical examination at a health care facility Z00.00 Lipid panel reflex to direct LDL Fasting   2. Mild intermittent asthma without complication J45.20 montelukast (SINGULAIR) 10 MG tablet     albuterol (PROAIR HFA/PROVENTIL HFA/VENTOLIN HFA) 108 (90 Base) MCG/ACT inhaler   3. Fatigue, unspecified type R53.83 CBC with platelets differential     TSH with free T4 reflex     Vitamin D Deficiency     Comprehensive metabolic panel     SLEEP EVALUATION & MANAGEMENT REFERRAL - [facility] [place]  [phone] (Age 15 and up)   4. Screen for STD (sexually transmitted disease) Z11.3 Chlamydia trachomatis PCR     Neisseria gonorrhoeae PCR     Hepatitis C antibody     HIV Antigen Antibody Combo     Treponema Abs w Reflex to RPR and Titer   5. BMI 36.0-36.9,adult Z68.36 SLEEP EVALUATION & MANAGEMENT REFERRAL - [facility] [place]  [phone] (Age 15 and up)   1. Labs pending. Work on Healthy diet and exercise. Getting heart rate elevated for 30 mins most days of week.  2. Ok for albuterol and singular as needed. warning signs discussed. side effects discussed recheck yearly or sooner if increase symptoms.   3. Labs pending. Follow up with sleep med.   4. Labs pending.   5. Work on Healthy diet and exercise. Getting heart rate elevated for 30 mins most days of week.  Recheck yearly.     Is going to come back a different day to get immunizations updated.     COUNSELING:   Reviewed preventive health counseling, as reflected in patient instructions       Regular exercise       " Healthy diet/nutrition    Estimated body mass index is 36.21 kg/m  as calculated from the following:    Height as of this encounter: 1.791 m (5' 10.5\").    Weight as of this encounter: 116.1 kg (256 lb).     Weight management plan: Discussed healthy diet and exercise guidelines     reports that he has never smoked. He has never used smokeless tobacco.      Counseling Resources:  ATP IV Guidelines  Pooled Cohorts Equation Calculator  FRAX Risk Assessment  ICSI Preventive Guidelines  Dietary Guidelines for Americans, 2010  USDA's MyPlate  ASA Prophylaxis  Lung CA Screening    Mateo Zapata PA-C  [facility] [place]  Answers for HPI/ROS submitted by the patient on 5/22/2019   Annual Exam:  If you checked off any problems, how difficult have these problems made it for you to do your work, take care of things at home, or get along with other people?: Very difficult  PHQ9 TOTAL SCORE: 13    "

## 2019-05-23 LAB
C TRACH DNA SPEC QL NAA+PROBE: NEGATIVE
DEPRECATED CALCIDIOL+CALCIFEROL SERPL-MC: 54 UG/L (ref 20–75)
HCV AB SERPL QL IA: NONREACTIVE
HIV 1+2 AB+HIV1 P24 AG SERPL QL IA: NONREACTIVE
N GONORRHOEA DNA SPEC QL NAA+PROBE: NEGATIVE
SPECIMEN SOURCE: NORMAL
SPECIMEN SOURCE: NORMAL

## 2019-05-23 ASSESSMENT — ASTHMA QUESTIONNAIRES: ACT_TOTALSCORE: 22

## 2019-05-23 ASSESSMENT — PATIENT HEALTH QUESTIONNAIRE - PHQ9: SUM OF ALL RESPONSES TO PHQ QUESTIONS 1-9: 13

## 2019-05-24 LAB — T PALLIDUM AB SER QL: NONREACTIVE

## 2019-05-24 NOTE — RESULT ENCOUNTER NOTE
Mr. Carpenter,    All of your labs were normal for you.    Please contact the clinic if you have additional questions.  Thank you.    Sincerely,    Mateo Zapata PA-C

## 2019-09-17 NOTE — PROGRESS NOTES
"Subjective     Mateo Carpenter is a 24 year old male who presents to clinic today for the following health issues:    HPI   Musculoskeletal problem/pain      Duration: 3 weeks     Description  Location: bilateral knee    Intensity:  0/10    Accompanying signs and symptoms: weakness at onset of injured but better since     History  Previous similar problem: no   Previous evaluation:  none    Precipitating or alleviating factors:  Trauma or overuse: YES- injured bilateral knee x 3 weeks ago   Aggravating factors include: when sitting for a long time and getting up    Therapies tried and outcome: rest/inactivity and Ibuprofen      Review of Systems   ROS COMP: Constitutional, HEENT, cardiovascular, pulmonary, gi and gu systems are negative, except as otherwise noted.      Objective    BP (!) 160/70   Pulse 110   Temp 98.6  F (37  C) (Oral)   Resp 20   Ht 1.791 m (5' 10.5\")   Wt 115.5 kg (254 lb 9.6 oz)   SpO2 98%   BMI 36.01 kg/m    Body mass index is 36.01 kg/m .  Physical Exam   GENERAL: healthy, alert and no distress  MS: RLE exam shows normal strength and muscle mass, no deformities, no erythema, induration, or nodules, no evidence of joint effusion, ROM of all joints is normal and sl MCL laxity and pain with rotation  and LLE exam shows normal strength and muscle mass, no erythema, induration, or nodules, no evidence of joint effusion, ROM of all joints is normal and sl MCL laxity and pain with rotation.  SKIN: no suspicious lesions or rashes.     Diagnostic Test Results:  Labs reviewed in Epic        Assessment & Plan     1. Elevated BP without diagnosis of hypertension   BP checks 2-3 x per week    2. Strain of knee, unspecified laterality, initial encounter   RICE:  Rest: Do not use affected limb or joint.  Ice: 20 minutes, 3 x daily.  Do not use heat.  Compression: Ace wrap or brace as instructed.  Elevation:  Keep joint elevated when not in use.       Follow up in 1 month  Bring BP values  Follow up " if symptoms should persist, change or worsen.  Patient amenable to this follow up plan.     Jackeline Augustine PA-C  [facility]

## 2019-09-18 ENCOUNTER — OFFICE VISIT (OUTPATIENT)
Dept: FAMILY MEDICINE | Facility: CLINIC | Age: 24
End: 2019-09-18
Payer: COMMERCIAL

## 2019-09-18 VITALS
RESPIRATION RATE: 20 BRPM | OXYGEN SATURATION: 98 % | WEIGHT: 254.6 LBS | HEART RATE: 110 BPM | SYSTOLIC BLOOD PRESSURE: 160 MMHG | TEMPERATURE: 98.6 F | HEIGHT: 71 IN | BODY MASS INDEX: 35.64 KG/M2 | DIASTOLIC BLOOD PRESSURE: 70 MMHG

## 2019-09-18 DIAGNOSIS — R03.0 ELEVATED BP WITHOUT DIAGNOSIS OF HYPERTENSION: Primary | ICD-10-CM

## 2019-09-18 DIAGNOSIS — S86.919A STRAIN OF KNEE, UNSPECIFIED LATERALITY, INITIAL ENCOUNTER: ICD-10-CM

## 2019-09-18 PROCEDURE — 99214 OFFICE O/P EST MOD 30 MIN: CPT | Performed by: PHYSICIAN ASSISTANT

## 2019-09-18 RX ORDER — MULTIPLE VITAMINS W/ MINERALS TAB 9MG-400MCG
1 TAB ORAL DAILY
COMMUNITY

## 2019-09-18 ASSESSMENT — MIFFLIN-ST. JEOR: SCORE: 2169.05

## 2019-09-18 ASSESSMENT — PAIN SCALES - GENERAL: PAINLEVEL: NO PAIN (0)

## 2019-10-11 NOTE — PROGRESS NOTES
"Subjective     Mateo Carpenter is a 24 year old male who presents to clinic today for the following health issues:  Patient noticed that he has elevated BP readings.  BP checks out of clinic are all in the normal range.      Review of Systems   ROS COMP: Constitutional, HEENT, cardiovascular, pulmonary, gi and gu systems are negative, except as otherwise noted.      Objective    /64   Pulse 79   Temp 98  F (36.7  C) (Oral)   Resp 20   Ht 1.778 m (5' 10\")   Wt 115.7 kg (255 lb)   SpO2 99%   BMI 36.59 kg/m    Body mass index is 36.59 kg/m .  Physical Exam   GENERAL: healthy, alert and no distress  NECK: no adenopathy, no asymmetry, masses, or scars and thyroid normal to palpation  RESP: lungs clear to auscultation - no rales, rhonchi or wheezes  CV: regular rate and rhythm, normal S1 S2, no S3 or S4, no murmur, click or rub, no peripheral edema and peripheral pulses strong  SKIN: no suspicious lesions or rashes    Diagnostic Test Results:  Labs reviewed in Epic        Assessment & Plan     1. White coat syndrome without hypertension      CPE in 1 year.  Check BP readings monthly.   Patient amenable to this follow up plan.          No follow-ups on file.    Jackeline Augustine PA-C  [facility]      "

## 2019-10-16 ENCOUNTER — OFFICE VISIT (OUTPATIENT)
Dept: FAMILY MEDICINE | Facility: CLINIC | Age: 24
End: 2019-10-16
Payer: COMMERCIAL

## 2019-10-16 VITALS
TEMPERATURE: 98 F | HEIGHT: 70 IN | BODY MASS INDEX: 36.51 KG/M2 | SYSTOLIC BLOOD PRESSURE: 136 MMHG | OXYGEN SATURATION: 99 % | WEIGHT: 255 LBS | HEART RATE: 79 BPM | DIASTOLIC BLOOD PRESSURE: 64 MMHG | RESPIRATION RATE: 20 BRPM

## 2019-10-16 DIAGNOSIS — Z23 NEED FOR PROPHYLACTIC VACCINATION AND INOCULATION AGAINST INFLUENZA: ICD-10-CM

## 2019-10-16 DIAGNOSIS — R03.0 WHITE COAT SYNDROME WITHOUT HYPERTENSION: Primary | ICD-10-CM

## 2019-10-16 PROCEDURE — 90471 IMMUNIZATION ADMIN: CPT | Performed by: PHYSICIAN ASSISTANT

## 2019-10-16 PROCEDURE — 99212 OFFICE O/P EST SF 10 MIN: CPT | Mod: 25 | Performed by: PHYSICIAN ASSISTANT

## 2019-10-16 PROCEDURE — 90686 IIV4 VACC NO PRSV 0.5 ML IM: CPT | Performed by: PHYSICIAN ASSISTANT

## 2019-10-16 ASSESSMENT — MIFFLIN-ST. JEOR: SCORE: 2162.92

## 2020-06-24 ENCOUNTER — TELEPHONE (OUTPATIENT)
Dept: FAMILY MEDICINE | Facility: CLINIC | Age: 25
End: 2020-06-24

## 2020-09-14 ENCOUNTER — ANCILLARY PROCEDURE (OUTPATIENT)
Dept: GENERAL RADIOLOGY | Facility: CLINIC | Age: 25
End: 2020-09-14
Attending: PHYSICIAN ASSISTANT
Payer: COMMERCIAL

## 2020-09-14 ENCOUNTER — OFFICE VISIT (OUTPATIENT)
Dept: FAMILY MEDICINE | Facility: CLINIC | Age: 25
End: 2020-09-14
Payer: COMMERCIAL

## 2020-09-14 VITALS
TEMPERATURE: 97.8 F | DIASTOLIC BLOOD PRESSURE: 76 MMHG | SYSTOLIC BLOOD PRESSURE: 132 MMHG | HEIGHT: 70 IN | RESPIRATION RATE: 20 BRPM | HEART RATE: 77 BPM | OXYGEN SATURATION: 96 % | WEIGHT: 270.2 LBS | BODY MASS INDEX: 38.68 KG/M2

## 2020-09-14 DIAGNOSIS — R07.9 CHEST PAIN, UNSPECIFIED TYPE: ICD-10-CM

## 2020-09-14 DIAGNOSIS — R07.9 CHEST PAIN, UNSPECIFIED TYPE: Primary | ICD-10-CM

## 2020-09-14 DIAGNOSIS — M25.521 RIGHT ELBOW PAIN: ICD-10-CM

## 2020-09-14 PROCEDURE — 71046 X-RAY EXAM CHEST 2 VIEWS: CPT

## 2020-09-14 PROCEDURE — 73070 X-RAY EXAM OF ELBOW: CPT | Mod: RT

## 2020-09-14 PROCEDURE — 90471 IMMUNIZATION ADMIN: CPT | Performed by: PHYSICIAN ASSISTANT

## 2020-09-14 PROCEDURE — 90715 TDAP VACCINE 7 YRS/> IM: CPT | Performed by: PHYSICIAN ASSISTANT

## 2020-09-14 PROCEDURE — 90686 IIV4 VACC NO PRSV 0.5 ML IM: CPT | Performed by: PHYSICIAN ASSISTANT

## 2020-09-14 PROCEDURE — 71100 X-RAY EXAM RIBS UNI 2 VIEWS: CPT | Mod: RT

## 2020-09-14 PROCEDURE — 90472 IMMUNIZATION ADMIN EACH ADD: CPT | Performed by: PHYSICIAN ASSISTANT

## 2020-09-14 PROCEDURE — 99213 OFFICE O/P EST LOW 20 MIN: CPT | Mod: 25 | Performed by: PHYSICIAN ASSISTANT

## 2020-09-14 RX ORDER — HYDROCODONE BITARTRATE AND ACETAMINOPHEN 5; 325 MG/1; MG/1
1-2 TABLET ORAL EVERY 6 HOURS PRN
Qty: 18 TABLET | Refills: 0 | Status: SHIPPED | OUTPATIENT
Start: 2020-09-14 | End: 2020-09-17

## 2020-09-14 ASSESSMENT — MIFFLIN-ST. JEOR: SCORE: 2234.36

## 2020-09-14 ASSESSMENT — ANXIETY QUESTIONNAIRES
3. WORRYING TOO MUCH ABOUT DIFFERENT THINGS: MORE THAN HALF THE DAYS
1. FEELING NERVOUS, ANXIOUS, OR ON EDGE: MORE THAN HALF THE DAYS
2. NOT BEING ABLE TO STOP OR CONTROL WORRYING: MORE THAN HALF THE DAYS
5. BEING SO RESTLESS THAT IT IS HARD TO SIT STILL: SEVERAL DAYS
IF YOU CHECKED OFF ANY PROBLEMS ON THIS QUESTIONNAIRE, HOW DIFFICULT HAVE THESE PROBLEMS MADE IT FOR YOU TO DO YOUR WORK, TAKE CARE OF THINGS AT HOME, OR GET ALONG WITH OTHER PEOPLE: SOMEWHAT DIFFICULT
6. BECOMING EASILY ANNOYED OR IRRITABLE: SEVERAL DAYS
GAD7 TOTAL SCORE: 11
7. FEELING AFRAID AS IF SOMETHING AWFUL MIGHT HAPPEN: SEVERAL DAYS

## 2020-09-14 ASSESSMENT — PATIENT HEALTH QUESTIONNAIRE - PHQ9
SUM OF ALL RESPONSES TO PHQ QUESTIONS 1-9: 7
5. POOR APPETITE OR OVEREATING: MORE THAN HALF THE DAYS
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
## 2020-09-14 NOTE — PATIENT INSTRUCTIONS
Shahbaz Galindo,    Thank you for allowing [facility] to manage your care.    I ordered some xrays, please go to our radiology department to get your xrays.    I sent your prescriptions to your pharmacy.    For your pain, please use Ibuprofen 400mg four times daily with food. Between ibuprofen doses, you may use Tylenol 650mg.     Max acetaminophen (Tylenol) 4,000mg/24 hours  Max ibuprofen 3,200mg/24 hours    Narcotics Discharge Instructions: Norco    You have been prescribed a narcotic pain medication that has risk for addiction with prolonged use, so please use sparingly.  Additionally, narcotics are medications that are sedating (will make you sleepy), so do not drive or operate machinery while taking this medication.  Avoid alcohol or other sedating medicines such as benzodiazepines while taking narcotics due to risk for increased sedation and difficulty breathing.      Narcotics will cause constipation.  If you need to take this medication please consider taking an over-the-counter stool softener and laxative, such as Senna Plus, to prevent constipation from developing.  Nausea is a side effect of narcotic use.  Possible additional side effects include vomiting, itching, and dizziness/lightheadedness.      Please allow 1-2 business days for our office to contact you in regards to your laboratory/radiological studies.  If not done so, I encourage you to login into [employer] ([email or web address] to review your results as well.     If you have any questions or concerns, please feel free to call us at [phone]    Sincerely,    Freeman Alanis PA-C    Did you know?  You can schedule an e-Visit for certain simple non-emergent issue for your convenience.  To learn more about or start an eVisit, simply login to [employer], click  Visits  on top banner, click  Start a Virtual Visit  drop down, and click  Symptom-Specific E-Visit     Patient Education     Rib Fracture    You broke one or more  ribs. This is called a rib fracture. Rib fractures don't need a cast like other bones. They will heal by themselves in about 4 to 6 weeks. The first 3 to 4 weeks will be the most painful. During this time deep breathing, coughing, or changing position from sitting to lying down, may cause the broken ends to move slightly.  Home care    Rest. You should not be doing any heavy lifting or strenuous exertion until the pain goes away.    It hurts to breathe when you have a broken rib. This puts you at risk of getting pneumonia from poor airflow through your lungs. To prevent this:  ? Take several very deep breaths once an hour while you're awake. Breathe out through pursed lips as if you are blowing up a balloon. If possible, actually blow up a balloon or a rubber glove. This exercise builds up pressure inside the lung and prevents collapse of the small air sacs of the lung. This exercise may cause some pain at the site of injury. This is normal.  ? You may have gotten a breathing exercise device called an incentive spirometer. Use it at least 4 times a day, or as directed.    Apply an ice pack over the injured area for 15 to 20 minutes every 1 to 2 hours. You should do this for the first 24 to 48 hours. To make an ice pack, put ice cubes in a plastic bag that seals at the top. Wrap the bag in a clean, thin towel or cloth. Never put ice or an ice pack directly on the skin. Keep using ice packs as needed for the relief of pain and swelling.    You may use over-the-counter pain medicine to control pain, unless another pain medicine was prescribed. If you have chronic liver or kidney disease or ever had a stomach ulcer or GI (gastrointestinal) bleeding, talk with your healthcare provider before using these medicines.    If your pain is not controlled, contact your healthcare provider. Sometimes a stronger pain medicine may be needed. A nerve block can be done in case of severe pain. It will numb the nerve between the  ribs.  Follow-up care  Follow up with your healthcare provider, or as advised. In rare cases, a broken rib will cause complications in the first few days that may not be clearly seen during your initial exam. This can include collapsed lung, bleeding around the lung or into the belly (abdomen), or pneumonia. So watch for the signs below.  If X-rays were taken, you will be told of any new findings that may affect your care.  Call 911  Call 911 if you have:    Dizziness, weakness or fainting    Shortness of breath with or without chest discomfort    New or worsening abdominal pain    Discomfort in other areas of your upper body such as your shoulders, jaw, neck, or arms  When to seek medical advice  Call your healthcare provider right away if any of these occur:    Increasing chest pain with breathing    Fever of 100.4 F (38 C) or above, or as directed by your healthcare provider    Congested cough, nausea, or vomiting  Date Last Reviewed: 4/1/2018 2000-2019 [employer]. [address] [place] [address], [place] [address]. All rights reserved. This information is not intended as a substitute for professional medical care. Always follow your healthcare professional's instructions.

## 2020-09-15 ENCOUNTER — OFFICE VISIT (OUTPATIENT)
Dept: AUDIOLOGY | Facility: CLINIC | Age: 25
End: 2020-09-15
Payer: COMMERCIAL

## 2020-09-15 ENCOUNTER — OFFICE VISIT (OUTPATIENT)
Dept: OTOLARYNGOLOGY | Facility: CLINIC | Age: 25
End: 2020-09-15
Payer: COMMERCIAL

## 2020-09-15 VITALS — HEART RATE: 70 BPM | DIASTOLIC BLOOD PRESSURE: 77 MMHG | SYSTOLIC BLOOD PRESSURE: 145 MMHG | OXYGEN SATURATION: 97 %

## 2020-09-15 DIAGNOSIS — H93.291 ABNORMAL AUDITORY PERCEPTION OF RIGHT EAR: Primary | ICD-10-CM

## 2020-09-15 DIAGNOSIS — H69.93 DYSFUNCTION OF BOTH EUSTACHIAN TUBES: ICD-10-CM

## 2020-09-15 DIAGNOSIS — H60.391 OTHER INFECTIVE CHRONIC OTITIS EXTERNA OF RIGHT EAR: Primary | ICD-10-CM

## 2020-09-15 DIAGNOSIS — H61.23 BILATERAL IMPACTED CERUMEN: ICD-10-CM

## 2020-09-15 PROCEDURE — 92550 TYMPANOMETRY & REFLEX THRESH: CPT | Performed by: AUDIOLOGIST

## 2020-09-15 PROCEDURE — 92557 COMPREHENSIVE HEARING TEST: CPT | Performed by: AUDIOLOGIST

## 2020-09-15 PROCEDURE — 92504 EAR MICROSCOPY EXAMINATION: CPT | Performed by: OTOLARYNGOLOGY

## 2020-09-15 PROCEDURE — 99203 OFFICE O/P NEW LOW 30 MIN: CPT | Mod: 25 | Performed by: OTOLARYNGOLOGY

## 2020-09-15 RX ORDER — TRIAMCINOLONE ACETONIDE 1 MG/G
OINTMENT TOPICAL 2 TIMES DAILY
Qty: 30 G | Refills: 0 | Status: SHIPPED | OUTPATIENT
Start: 2020-09-15

## 2020-09-15 RX ORDER — AZELASTINE 1 MG/ML
1 SPRAY, METERED NASAL 2 TIMES DAILY
Qty: 2 BOTTLE | Refills: 3 | Status: SHIPPED | OUTPATIENT
Start: 2020-09-15

## 2020-09-15 ASSESSMENT — ANXIETY QUESTIONNAIRES: GAD7 TOTAL SCORE: 11

## 2020-09-15 ASSESSMENT — ENCOUNTER SYMPTOMS
TINGLING: 0
COUGH: 0
NAUSEA: 0
CONSTITUTIONAL NEGATIVE: 1
BRUISES/BLEEDS EASILY: 0
PHOTOPHOBIA: 0
SPUTUM PRODUCTION: 0
HEMOPTYSIS: 0
BLURRED VISION: 0
HEADACHES: 0
DIZZINESS: 0
VOMITING: 0
HEARTBURN: 0
DOUBLE VISION: 0

## 2020-09-15 ASSESSMENT — ASTHMA QUESTIONNAIRES: ACT_TOTALSCORE: 24

## 2020-09-15 ASSESSMENT — PAIN SCALES - GENERAL: PAINLEVEL: NO PAIN (0)

## 2020-09-15 NOTE — LETTER
9/15/2020         RE: Mateo Carpenter  [address] [place], [place] [id number]        Dear Colleague,    Thank you for referring your patient, Mateo Carpenter, to the [facility]. Please see a copy of my visit note below.    HPI    This is a 25 year old patient who has been having hypersensitivity to loud sound in his right ear for years. States vibration when it happens. Denies any fever, ear drainage, pain, pressure, tinnitus, dizziness or vertigo. No vision issues, weakness, numbness or tingling. He has a hx of acoustic trauma, allergic rhinitis.    Review of Systems   Constitutional: Negative.    HENT: Negative for ear pain, hearing loss and tinnitus.    Eyes: Negative for blurred vision, double vision and photophobia.   Respiratory: Negative for cough, hemoptysis and sputum production.    Cardiovascular: Negative for chest pain.   Gastrointestinal: Negative for heartburn, nausea and vomiting.   Skin: Negative.    Neurological: Negative for dizziness, tingling and headaches.   Endo/Heme/Allergies: Negative for environmental allergies. Does not bruise/bleed easily.         Physical Exam  Vitals signs reviewed.   Constitutional:       Appearance: Normal appearance.   HENT:      Head: Normocephalic and atraumatic.      Right Ear: Tympanic membrane, ear canal and external ear normal. No decreased hearing noted. No middle ear effusion. There is impacted cerumen.      Left Ear: Tympanic membrane, ear canal and external ear normal. No decreased hearing noted.  No middle ear effusion. There is no impacted cerumen.      Nose: Congestion present. No septal deviation, laceration, mucosal edema or rhinorrhea.      Mouth/Throat:      Mouth: Mucous membranes are moist.      Pharynx: Oropharynx is clear. Uvula midline.   Eyes:      Extraocular Movements: Extraocular movements intact.      Pupils: Pupils are equal, round, and reactive to light.   Neurological:      Mental Status: He is alert.     Right ear  canal: eczematoid appearance. Ear canals were visualized under the microscope. Ear wax was suctioned with a tip 5-7. Ear drums appeared to be intact.     A/P  This pleasant patient is having hyperacusis. The underlying problems were explained. It is likely that he is having muscle spasms in the middle ear when he is exposed to a loud noise. Options were discussed including desensitization to a loud noise, topical sprays as well as surgical options. His questions were answered.        Again, thank you for allowing me to participate in the care of your patient.        Sincerely,        Dona Pérez MD

## 2020-09-15 NOTE — PROGRESS NOTES
AUDIOLOGY REPORT    SUMMARY: Audiology visit completed. See audiogram for results.    RECOMMENDATIONS: Follow-up with ENT.    Rachele London  Doctor of Audiology  [place] License # [id number]

## 2020-09-15 NOTE — PROGRESS NOTES
HPI    This is a 25 year old patient who has been having hypersensitivity to loud sound in his right ear for years. States vibration when it happens. Denies any fever, ear drainage, pain, pressure, tinnitus, dizziness or vertigo. No vision issues, weakness, numbness or tingling. He has a hx of acoustic trauma, allergic rhinitis.    Review of Systems   Constitutional: Negative.    HENT: Negative for ear pain, hearing loss and tinnitus.    Eyes: Negative for blurred vision, double vision and photophobia.   Respiratory: Negative for cough, hemoptysis and sputum production.    Cardiovascular: Negative for chest pain.   Gastrointestinal: Negative for heartburn, nausea and vomiting.   Skin: Negative.    Neurological: Negative for dizziness, tingling and headaches.   Endo/Heme/Allergies: Negative for environmental allergies. Does not bruise/bleed easily.         Physical Exam  Vitals signs reviewed.   Constitutional:       Appearance: Normal appearance.   HENT:      Head: Normocephalic and atraumatic.      Right Ear: Tympanic membrane, ear canal and external ear normal. No decreased hearing noted. No middle ear effusion. There is impacted cerumen.      Left Ear: Tympanic membrane, ear canal and external ear normal. No decreased hearing noted.  No middle ear effusion. There is no impacted cerumen.      Nose: Congestion present. No septal deviation, laceration, mucosal edema or rhinorrhea.      Mouth/Throat:      Mouth: Mucous membranes are moist.      Pharynx: Oropharynx is clear. Uvula midline.   Eyes:      Extraocular Movements: Extraocular movements intact.      Pupils: Pupils are equal, round, and reactive to light.   Neurological:      Mental Status: He is alert.     Right ear canal: eczematoid appearance. Ear canals were visualized under the microscope. Ear wax was suctioned with a tip 5-7. Ear drums appeared to be intact.     A/P  This pleasant patient is having hyperacusis. The underlying problems were explained. It  is likely that he is having muscle spasms in the middle ear when he is exposed to a loud noise. Options were discussed including desensitization to a loud noise, topical sprays as well as surgical options. His questions were answered.

## 2020-09-15 NOTE — NURSING NOTE
Mateo Carpenter's goals for this visit include:   Chief Complaint   Patient presents with     Ear Problem     Heavy noise exposure in the past - right ear sensitive, hurts when sore throat occurs      Hearing Problem     Fuzzy sound in right ear x2 years     He requests these members of his care team be copied on today's visit information: Yes    PCP: Pee Pino    Referring Provider:  No referring provider defined for this encounter.    BP (!) 145/77 (BP Location: Right arm, Patient Position: Sitting, Cuff Size: Adult Large)   Pulse 70   SpO2 97%     Do you need any medication refills at today's visit? No    Jacklyn Mark LPN

## 2020-10-19 ENCOUNTER — OFFICE VISIT (OUTPATIENT)
Dept: FAMILY MEDICINE | Facility: CLINIC | Age: 25
End: 2020-10-19
Payer: COMMERCIAL

## 2020-10-19 VITALS
TEMPERATURE: 98 F | OXYGEN SATURATION: 100 % | HEART RATE: 77 BPM | SYSTOLIC BLOOD PRESSURE: 141 MMHG | DIASTOLIC BLOOD PRESSURE: 82 MMHG | BODY MASS INDEX: 38.71 KG/M2 | WEIGHT: 270.38 LBS | HEIGHT: 70 IN | RESPIRATION RATE: 14 BRPM

## 2020-10-19 DIAGNOSIS — Z23 ENCOUNTER FOR IMMUNIZATION: ICD-10-CM

## 2020-10-19 DIAGNOSIS — Z71.1 CONCERN ABOUT STD IN MALE WITHOUT DIAGNOSIS: ICD-10-CM

## 2020-10-19 DIAGNOSIS — Z11.3 SCREEN FOR STD (SEXUALLY TRANSMITTED DISEASE): Primary | ICD-10-CM

## 2020-10-19 PROCEDURE — 87491 CHLMYD TRACH DNA AMP PROBE: CPT | Performed by: PHYSICIAN ASSISTANT

## 2020-10-19 PROCEDURE — 86695 HERPES SIMPLEX TYPE 1 TEST: CPT | Performed by: PHYSICIAN ASSISTANT

## 2020-10-19 PROCEDURE — 90471 IMMUNIZATION ADMIN: CPT | Performed by: PHYSICIAN ASSISTANT

## 2020-10-19 PROCEDURE — 99000 SPECIMEN HANDLING OFFICE-LAB: CPT | Performed by: PHYSICIAN ASSISTANT

## 2020-10-19 PROCEDURE — 86780 TREPONEMA PALLIDUM: CPT | Mod: 90 | Performed by: PHYSICIAN ASSISTANT

## 2020-10-19 PROCEDURE — 87591 N.GONORRHOEAE DNA AMP PROB: CPT | Performed by: PHYSICIAN ASSISTANT

## 2020-10-19 PROCEDURE — 99213 OFFICE O/P EST LOW 20 MIN: CPT | Mod: 25 | Performed by: PHYSICIAN ASSISTANT

## 2020-10-19 PROCEDURE — 86696 HERPES SIMPLEX TYPE 2 TEST: CPT | Performed by: PHYSICIAN ASSISTANT

## 2020-10-19 PROCEDURE — 90651 9VHPV VACCINE 2/3 DOSE IM: CPT | Performed by: PHYSICIAN ASSISTANT

## 2020-10-19 PROCEDURE — 36415 COLL VENOUS BLD VENIPUNCTURE: CPT | Performed by: PHYSICIAN ASSISTANT

## 2020-10-19 PROCEDURE — 87389 HIV-1 AG W/HIV-1&-2 AB AG IA: CPT | Performed by: PHYSICIAN ASSISTANT

## 2020-10-19 ASSESSMENT — MIFFLIN-ST. JEOR: SCORE: 2227.66

## 2020-10-19 NOTE — PROGRESS NOTES
"Subjective     Mateo Carpenter is a 25 year old male who presents to clinic today for the following health issues:    HPI         Concern - STD check     Description: std check, new partner X 2-3 weeks ago. No condom use. No known exposure to STI. No signs or symptoms reported.     Review of Systems   Constitutional, gi and gu systems are negative, except as otherwise noted.      Objective    BP (!) 151/83   Pulse 79   Temp 98  F (36.7  C) (Tympanic)   Resp 14   Ht 1.778 m (5' 10\")   Wt 122.6 kg (270 lb 6 oz)   SpO2 100%   BMI 38.79 kg/m    Body mass index is 38.79 kg/m .  Physical Exam  Vitals signs and nursing note reviewed.   Constitutional:       General: He is not in acute distress.     Appearance: Normal appearance. He is not diaphoretic.   HENT:      Head: Normocephalic and atraumatic.      Nose: Nose normal.   Eyes:      Conjunctiva/sclera: Conjunctivae normal.   Pulmonary:      Effort: Pulmonary effort is normal. No respiratory distress.   Skin:     General: Skin is warm and dry.      Coloration: Skin is not jaundiced or pale.   Neurological:      General: No focal deficit present.      Mental Status: He is alert. Mental status is at baseline.   Psychiatric:         Mood and Affect: Mood normal.         Behavior: Behavior normal.     GC-chlamydia, syphillis, Hepatitis panel, HSV and HIV pending.    Assessment & Plan   Problem List Items Addressed This Visit     None      Visit Diagnoses     Screen for STD (sexually transmitted disease)    -  Primary    Relevant Orders    HIV Antigen Antibody Combo (Completed)    Treponema Abs w Reflex to RPR and Titer (Completed)    NEISSERIA GONORRHOEA PCR (Completed)    CHLAMYDIA TRACHOMATIS PCR (Completed)    Hepatitis Panel Acute W/Reflex    Herpes Simplex Virus 1 and 2 IgG (Completed)    Concern about STD in male without diagnosis        Relevant Orders    HIV Antigen Antibody Combo (Completed)    Treponema Abs w Reflex to RPR and Titer (Completed)    " NEISSERIA GONORRHOEA PCR (Completed)    CHLAMYDIA TRACHOMATIS PCR (Completed)    Hepatitis Panel Acute W/Reflex    Herpes Simplex Virus 1 and 2 IgG (Completed)    Encounter for immunization        Relevant Orders    HPV, IM (9 - 26 YRS) - Gardasil 9 (Completed)         Impression is 24yo otherwise healthy male presenting for STI screening as he had an unprotected sexual encounter 3 weeks ago with a new partner that he does not trust.  No known STI exposure.  Feels that his fractured rib from last month is healing well and would like to start working out again.  No GI/ symptoms.  STI tests pending.  Follow-up as needed.    Complete history and physical exam as above. AF with normal VS except for elevated bp which I encourage him to monitor at home and follow up with us if >140/90mmHg.    DDx and Dx discussed with and explained to the pt to their satisfaction.  All questions were answered at this time. Pt expressed understanding of and agreement with this dx, tx, and plan. No further workup warranted and standard medication warnings given. I have given the patient a list of pertinent indications for re-evaluation. Will go to the Emergency Department if symptoms worsen or new concerning symptoms arise. Patient left in no apparent distress.     See Patient Instructions  Return if symptoms worsen or fail to improve.    DILLAN Chavez  [facility]

## 2020-10-19 NOTE — NURSING NOTE
Prior to immunization administration, verified patients identity using patient s name and date of birth. Please see Immunization Activity for additional information.     Screening Questionnaire for Adult Immunization    Are you sick today?   No   Do you have allergies to medications, food, a vaccine component or latex?   No   Have you ever had a serious reaction after receiving a vaccination?   No   Do you have a long-term health problem with heart, lung, kidney, or metabolic disease (e.g., diabetes), asthma, a blood disorder, no spleen, complement component deficiency, a cochlear implant, or a spinal fluid leak?  Are you on long-term aspirin therapy?   No   Do you have cancer, leukemia, HIV/AIDS, or any other immune system problem?   No   Do you have a parent, brother, or sister with an immune system problem?   No   In the past 3 months, have you taken medications that affect  your immune system, such as prednisone, other steroids, or anticancer drugs; drugs for the treatment of rheumatoid arthritis, Crohn s disease, or psoriasis; or have you had radiation treatments?   No   Have you had a seizure, or a brain or other nervous system problem?   No   During the past year, have you received a transfusion of blood or blood    products, or been given immune (gamma) globulin or antiviral drug?   No   For women: Are you pregnant or is there a chance you could become       pregnant during the next month?   No   Have you received any vaccinations in the past 4 weeks?   No     Immunization questionnaire answers were all negative.        Per orders of [place], injection of Gardasil given by Alessia Sterling. Patient instructed to remain in clinic for 15 minutes afterwards, and to report any adverse reaction to me immediately.       Screening performed by Alessia Sterling on 10/19/2020 at 12:10 PM.

## 2020-10-19 NOTE — PATIENT INSTRUCTIONS
Shahbaz Galindo,    Thank you for allowing [facility] to manage your care.    I ordered some blood work, please go to the laboratory to get your laboratory studies.    Please allow 1-2 business days for our office to contact you in regards to your laboratory/radiological studies.  If not done so, I encourage you to login into [facility] [email or web address] to review your results as well.     If you have any questions or concerns, please feel free to call us at [phone]    Sincerely,    Freeman Alanis PA-C    Did you know?  You can schedule an e-Visit for certain simple non-emergent issue for your convenience.  To learn more about or start an eVisit, simply login to [facility], click  Visits  on top banner, click  Start a Virtual Visit  drop down, and click  Symptom-Specific E-Visit     Patient Education     Understanding STDs    When it comes to sex, nothing is risk-free. Any sexual contact with the penis, vagina, anus, or mouth can spread a sexually transmitted disease (STD). The only sure way to prevent STDs is abstinence (not having sex). But there are ways to make sex safer. Use a latex condom each time you have sex. And choose your partner wisely.  Use condoms for safer sex  If you have sex, latex condoms provide the best protection against STDs. Latex condoms stop the exchange of body fluids that carry certain STDs. They also limit contact with affected skin. Be aware though, a condom doesn t cover all skin. So, affected skin that is not covered can still transfer disease. But you re safer with a condom than without one. Use a condom even if you use other birth control. While birth control methods like the pill or IUD help prevent pregnancy, they do not protect against STDs.  Choose the right condom  Condoms made of latex prevent disease best. If you re allergic to latex, use polyurethane condoms instead. Male condoms fit over the penis. Female condoms line the vagina. Before buying  a condom, read the label to be sure it prevents disease. Some novelty condoms don t.  The right lubricant helps  Buy lubricated condoms or use lubricant. This provides greater comfort and reduces the risk of condom breakage. Use only water-based lubricants. Don t use oil, lotion, or petroleum jelly. They can weaken the condom, causing breakage. Also, you may want to choose lubricants without nonoxynol-9. It s now known that this spermicide does not prevent disease and may cause irritation.  Use condoms correctly  For condoms to work, they must be used the right way. Keep these tips in mind:    Use a new latex condom each time you have sex. Slip the condom on the penis before any contact is made.    When ready to withdraw, hold the rim of the condom as the penis pulls out. This prevents the condom from slipping off.    Check the expiration date before using a condom.    Don t store condoms in places that can get hot, such as a car or a wallet that is carried in a back pocket.  Get to know your partner  Safer sex is a process. It involves getting to know your partner and making informed choices. Ask each other how many partners you have had in the past, and how many you have now. Find out if either of you has an STD. If you decide to have sex, use a condom each time. Don t stop using condoms unless you re sure neither of you has other partners and you ve both been tested to confirm you don t have STDs. Then stay free of disease by having sex only with each other (monogamy).  Keep your cool  Don t let alcohol or drugs cloud your judgment. They could lead you to have sex with someone you wouldn t have chosen if you were sober. Or, you might forget to use a condom. If you do plan to have sex, keep a latex condom with you. Don t wait until you re in the heat of passion to try to find one.  Consider abstinence  The only way to be sure you won t get an STD is to abstain from sex. Abstinence is a choice that many people make  at some point in their lives. Maybe you want to wait until you are sure you re ready before you have sex. Maybe you d like a break from the responsibilities of sex for a while. Or maybe you just want to know your partner better before taking the next step. Abstinence is a choice you can make now to protect your future.  Date Last Reviewed: 12/1/2016 2000-2019 [employer]. [address] [place] [address], [place] [address]. All rights reserved. This information is not intended as a substitute for professional medical care. Always follow your healthcare professional's instructions.

## 2020-10-20 LAB
C TRACH DNA SPEC QL NAA+PROBE: NEGATIVE
HIV 1+2 AB+HIV1 P24 AG SERPL QL IA: NONREACTIVE
HSV1 IGG SERPL QL IA: <0.2 AI (ref 0–0.8)
HSV2 IGG SERPL QL IA: <0.2 AI (ref 0–0.8)
N GONORRHOEA DNA SPEC QL NAA+PROBE: NEGATIVE
SPECIMEN SOURCE: NORMAL
SPECIMEN SOURCE: NORMAL
T PALLIDUM AB SER QL: NONREACTIVE

## 2020-11-29 DIAGNOSIS — J45.20 MILD INTERMITTENT ASTHMA WITHOUT COMPLICATION: ICD-10-CM

## 2020-12-01 RX ORDER — ALBUTEROL SULFATE 90 UG/1
AEROSOL, METERED RESPIRATORY (INHALATION)
Qty: 18 G | Refills: 1 | Status: SHIPPED | OUTPATIENT
Start: 2020-12-01 | End: 2022-05-09 | Stop reason: ALTCHOICE

## 2020-12-13 ENCOUNTER — HEALTH MAINTENANCE LETTER (OUTPATIENT)
Age: 25
End: 2020-12-13

## 2021-04-09 ENCOUNTER — OFFICE VISIT (OUTPATIENT)
Dept: AUDIOLOGY | Facility: CLINIC | Age: 26
End: 2021-04-09
Payer: COMMERCIAL

## 2021-04-09 ENCOUNTER — OFFICE VISIT (OUTPATIENT)
Dept: OTOLARYNGOLOGY | Facility: CLINIC | Age: 26
End: 2021-04-09
Payer: COMMERCIAL

## 2021-04-09 VITALS
DIASTOLIC BLOOD PRESSURE: 83 MMHG | RESPIRATION RATE: 18 BRPM | HEART RATE: 69 BPM | SYSTOLIC BLOOD PRESSURE: 148 MMHG | OXYGEN SATURATION: 98 %

## 2021-04-09 DIAGNOSIS — H61.21 IMPACTED CERUMEN OF RIGHT EAR: Primary | ICD-10-CM

## 2021-04-09 DIAGNOSIS — H61.23 BILATERAL IMPACTED CERUMEN: Primary | ICD-10-CM

## 2021-04-09 PROCEDURE — 69210 REMOVE IMPACTED EAR WAX UNI: CPT | Performed by: OTOLARYNGOLOGY

## 2021-04-09 PROCEDURE — 99207 PR NO CHARGE LOS: CPT | Performed by: AUDIOLOGIST

## 2021-04-09 NOTE — LETTER
4/9/2021         RE: Mateo Carpenter  [address] [place] [id number]        Dear Colleague,    Thank you for referring your patient, Mateo Carpenter, to the [facility]. Please see a copy of my visit note below.    Cerumen Removal - Patient here for ear plugging and ear cleaning. Denies ear pain, drainage, and infection.     Physical Exam and Procedure  Ears - On examination of the ears, I found that both ears were impacted with cerumen.  Therefore, I positioned the patient in the examination chair in a semi-supine position. I used the binocular surgical microscope to perform cerumen removal.  On the right side, I began by using a cerumen loop to gently lift the edges of the cerumen mass away from the walls of the external canal.  Once I did this, I was able to pull, but then also had to suction away fragments of wax and debris. I removed all the wax and debri.  The tympanic membrane was intact, no sign of perforation or middle ear effusion.    I turned my attention to the left side once again using the binocular surgical microscope to perform cerumen removal.  I began by using a cerumen loop to gently lift the edges of the cerumen mass away from the walls of the external canal.  Once I did this, I was able to pull away fragments of wax and debris. I removed all the wax and debri. The tympanic membrane was intact, no sign of perforation or middle ear effusion.    A/P - bilateral cerumen impaction. Both ears cleaned today in clinic. Return prn.         Again, thank you for allowing me to participate in the care of your patient.        Sincerely,        Walter Reyes MD

## 2021-04-09 NOTE — PROGRESS NOTES
Cerumen Removal - Patient here for ear plugging and ear cleaning. Denies ear pain, drainage, and infection.     Physical Exam and Procedure  Ears - On examination of the ears, I found that both ears were impacted with cerumen.  Therefore, I positioned the patient in the examination chair in a semi-supine position. I used the binocular surgical microscope to perform cerumen removal.  On the right side, I began by using a cerumen loop to gently lift the edges of the cerumen mass away from the walls of the external canal.  Once I did this, I was able to pull, but then also had to suction away fragments of wax and debris. I removed all the wax and debri.  The tympanic membrane was intact, no sign of perforation or middle ear effusion.    I turned my attention to the left side once again using the binocular surgical microscope to perform cerumen removal.  I began by using a cerumen loop to gently lift the edges of the cerumen mass away from the walls of the external canal.  Once I did this, I was able to pull away fragments of wax and debris. I removed all the wax and debri. The tympanic membrane was intact, no sign of perforation or middle ear effusion.    A/P - bilateral cerumen impaction. Both ears cleaned today in clinic. Return prn.

## 2021-04-09 NOTE — PROGRESS NOTES
AUDIOLOGY REPORT:    Patient was referred from ENT by Dr. Reyes for audiology evaluation. The patient reports that his right ear feels plugged up and he has had decreased hearing, especially when he first gets up in the morning. He also notes a sensation of something in his ear and tinnitus in the right ear. The patient reports that he has not had drainage from the ear. He tried Debrox but was not able to get anything out. The patient reports that he does not have ear pain or pressure and does not have a history of ear problems or ear surgery. He was last seen at the [facility] on 9/15/2020 for sound sensitivity in the right ear, and results showed normal hearing sensitivity bilaterally.    Testing:    Otoscopy:   Otoscopic exam indicates cerumen in the right ear. The left ear is essentially clear.    Patient was returned to ENT for follow up. No further testing was completed today.    Rachele Gonzales, CCC-A  Licensed Audiologist #[id number]  4/9/2021

## 2021-06-08 ENCOUNTER — OFFICE VISIT (OUTPATIENT)
Dept: FAMILY MEDICINE | Facility: CLINIC | Age: 26
End: 2021-06-08
Payer: COMMERCIAL

## 2021-06-08 VITALS
BODY MASS INDEX: 36.51 KG/M2 | TEMPERATURE: 98 F | RESPIRATION RATE: 18 BRPM | WEIGHT: 255 LBS | SYSTOLIC BLOOD PRESSURE: 151 MMHG | HEIGHT: 70 IN | HEART RATE: 75 BPM | DIASTOLIC BLOOD PRESSURE: 82 MMHG | OXYGEN SATURATION: 97 %

## 2021-06-08 DIAGNOSIS — Z23 NEED FOR PNEUMOCOCCAL VACCINATION: ICD-10-CM

## 2021-06-08 DIAGNOSIS — M54.50 ACUTE RIGHT-SIDED LOW BACK PAIN WITHOUT SCIATICA: Primary | ICD-10-CM

## 2021-06-08 DIAGNOSIS — J45.20 MILD INTERMITTENT ASTHMA WITHOUT COMPLICATION: ICD-10-CM

## 2021-06-08 PROCEDURE — 90732 PPSV23 VACC 2 YRS+ SUBQ/IM: CPT | Performed by: NURSE PRACTITIONER

## 2021-06-08 PROCEDURE — 99214 OFFICE O/P EST MOD 30 MIN: CPT | Mod: 25 | Performed by: NURSE PRACTITIONER

## 2021-06-08 PROCEDURE — 90471 IMMUNIZATION ADMIN: CPT | Performed by: NURSE PRACTITIONER

## 2021-06-08 RX ORDER — ALBUTEROL SULFATE 90 UG/1
AEROSOL, METERED RESPIRATORY (INHALATION)
Qty: 18 G | Refills: 1 | Status: CANCELLED | OUTPATIENT
Start: 2021-06-08

## 2021-06-08 RX ORDER — ALBUTEROL SULFATE 90 UG/1
2 AEROSOL, METERED RESPIRATORY (INHALATION) EVERY 6 HOURS
Qty: 18 G | Refills: 1 | Status: SHIPPED | OUTPATIENT
Start: 2021-06-08 | End: 2021-12-24

## 2021-06-08 RX ORDER — CYCLOBENZAPRINE HCL 5 MG
5 TABLET ORAL 3 TIMES DAILY PRN
Qty: 30 TABLET | Refills: 0 | Status: SHIPPED | OUTPATIENT
Start: 2021-06-08 | End: 2021-06-18

## 2021-06-08 RX ORDER — NAPROXEN 500 MG/1
500 TABLET ORAL 2 TIMES DAILY WITH MEALS
Qty: 60 TABLET | Refills: 0 | Status: SHIPPED | OUTPATIENT
Start: 2021-06-08 | End: 2021-07-08

## 2021-06-08 ASSESSMENT — MIFFLIN-ST. JEOR: SCORE: 2152.92

## 2021-06-08 ASSESSMENT — PAIN SCALES - GENERAL: PAINLEVEL: EXTREME PAIN (9)

## 2021-06-08 NOTE — NURSING NOTE
Prior to immunization administration, verified patients identity using patient s name and date of birth. Please see Immunization Activity for additional information.     Screening Questionnaire for Adult Immunization    Are you sick today?   No   Do you have allergies to medications, food, a vaccine component or latex?   No   Have you ever had a serious reaction after receiving a vaccination?   No   Do you have a long-term health problem with heart, lung, kidney, or metabolic disease (e.g., diabetes), asthma, a blood disorder, no spleen, complement component deficiency, a cochlear implant, or a spinal fluid leak?  Are you on long-term aspirin therapy?   No   Do you have cancer, leukemia, HIV/AIDS, or any other immune system problem?   No   Do you have a parent, brother, or sister with an immune system problem?   No   In the past 3 months, have you taken medications that affect  your immune system, such as prednisone, other steroids, or anticancer drugs; drugs for the treatment of rheumatoid arthritis, Crohn s disease, or psoriasis; or have you had radiation treatments?   No   Have you had a seizure, or a brain or other nervous system problem?   No   During the past year, have you received a transfusion of blood or blood    products, or been given immune (gamma) globulin or antiviral drug?   No   For women: Are you pregnant or is there a chance you could become       pregnant during the next month?   No   Have you received any vaccinations in the past 4 weeks?   Yes     Immunization questionnaire was positive for at least one answer.  Notified provider.        Per orders of Tiffany Calderon, injection of PPSV23 given by Vishal Alcala. Patient instructed to remain in clinic for 15 minutes afterwards, and to report any adverse reaction to me immediately.       Screening performed by Vishal Alcala on 6/8/2021 at 3:05 PM.  [id number], MEDICAL ASSISTANT

## 2021-06-08 NOTE — PATIENT INSTRUCTIONS
Patient Education     Exercises to Strengthen Your Lower Back  Strong lower back and abdominal muscles work together to support your spine. The exercises below will help strengthen the lower back. It's important that you start exercising slowly and increase levels gradually.   Always start any exercise program with stretching. If you feel pain while doing any of these exercises, stop and talk to your doctor about a more specific exercise program that better suits your condition.    Low back stretch  The point of stretching is to make you more flexible and increase your range of motion. Stretch only as much as you are able. Stretch slowly. Don't push your stretch to the limit. If at any point you feel pain while stretching, this is your (temporary) limit.     Lie on your back with your knees bent and both feet on the ground.    Slowly raise your left knee to your chest as you flatten your lower back against the floor. Hold for 5 seconds.    Relax and repeat the exercise with your right knee.    Do 10 of these exercises for each leg.    Repeat hugging both knees to your chest at the same time.  Building lower back strength  Start your exercise routine with 10 to 30 minutes a day, 1 to 3 times a day.  Initial exercises  Lying on your back:  1. Ankle pumps. Move your foot up and down, towards your head, and then away. Repeat 10 times with each foot.  2. Heel slides. Slowly bend your knee, drawing the heel of your foot towards you. Then slide your heel/foot from you, straightening your knee. Don't lift your foot off the floor (this is not a leg lift).  3. Abdominal contraction. Bend your knees and put your hands on your stomach. Tighten your stomach muscles. Hold for 5 seconds, then relax. Repeat 10 times.  4. Straight leg raise. Bend one leg at the knee and keep the other leg straight. Tighten your stomach muscles. Slowly lift your straight leg 6 to 12 inches off the floor and hold for up to 5 seconds. Repeat 10 times  on each side.  Standin. Wall squats. Stand with your back against the wall. Move your feet about 12 inches away from the wall. Tighten your stomach muscles, and slowly bend your knees until they are at about a 45 degree angle. Don't go down too far. Hold about 5 seconds. Then slowly return to your starting position. Repeat 10 times.  2. Heel raises. Stand facing the wall. Slowly raise the heels of your feet up and down, while keeping your toes on the floor. If you have trouble balancing, you can touch the wall with your hands. Repeat 10 times.  More advanced exercises  When you feel comfortable enough, try these exercises.  1. Kneeling lumbar extension. Start on your hands and knees. At the same time, raise and straighten your right arm and left leg until they are parallel to the ground. Hold for 2 seconds and come back slowly to a starting position. Repeat with left arm and right leg, alternating 10 times.  2. Prone lumbar extension. Lie face down, arms extended overhead, palms on the floor. At the same time, raise your right arm and left leg as high as comfortably possible. Hold for 10 seconds and slowly return to start. Repeat with left arm and right leg, alternating 10 times. Gradually build up to 20 times. (Advanced: Repeat this exercise raising both arms and both legs a few inches off the floor at the same time. Hold for 5 seconds and release.)  3. Pelvic tilt. Lie on the floor on your back with your knees bent at 90 degrees. Your feet should be flat on the floor. Inhale, exhale, then slowly contract your abdominal muscles bringing your navel toward your spine. Let your pelvis rock back until your lower back is flat on the floor. Hold for 10 seconds while breathing smoothly.  4. Abdominal crunch. Perform a pelvic tilt (above) flattening your lower back against the floor. Holding the tension in your abdominal muscles, take another breath and raise your shoulder blades off the ground (this is not a full  sit-up). Keep your head in line with your body (don t bend your neck forward). Hold for 2 seconds, then slowly lower.  [employer] last reviewed this educational content on 8/1/2019 2000-2021 [employer]. All rights reserved. This information is not intended as a substitute for professional medical care. Always follow your healthcare professional's instructions.           Patient Education     Back Sprain or Strain     Injury to the muscles (strain) or ligaments (sprain) around the spine can be troubling. Injury may occur after a sudden forceful twisting or bending such as in a car accident, after a simple awkward movement, or after lifting something heavy with poor body positioning. In any case, muscle spasm is often present and adds to the pain.  Thankfully, most people feel better in 1 to 2 weeks. Most of the rest feel better in 1 to 2 months. Most people can remain active. Unless you had a forceful or traumatic physical injury such as a car accident or fall, X-rays may not be done for the first assessment of a back sprain or strain. If pain continues and doesn't respond to medical treatment, your healthcare provider may then do X-rays and other tests.  Home care  These guidelines will help you care for your injury at home:    When in bed, try to find a comfortable position. A firm mattress is best. Try lying flat on your back with pillows under your knees. You can also try lying on your side with your knees bent up toward your chest and a pillow between your knees.    Don't sit for long periods. Try not to take long car rides or take other trips that have you sitting for a long time. This puts more stress on the lower back than standing or walking.    During the first 24 to 72 hours after an injury or flare-up, put an ice pack on the painful area for 20 minutes. Then remove it for 20 minutes. Do this for 60 to 90 minutes, or several times a day. This will reduce swelling and pain. Always wrap the  ice pack in a thin towel or plastic to protect your skin.    You can start with ice, then switch to heat. Heat from a hot shower, hot bath, or heating pad reduces pain and works well for muscle spasms. Put heat on the painful area for 20 minutes, then remove for 20 minutes. Do this for 60 to 90 minutes, or several times a day. Don't use a heating pad while sleeping. It can burn the skin.    You can alternate the ice and heat. Talk with your healthcare provider to find out the best treatment or therapy for your back pain.    Therapeutic massage can help relax the back muscles without stretching them.    Be aware of safe lifting methods. Don't lift anything over 15 pounds until all of the pain is gone.  Medicines  Talk with your healthcare provider before using medicines, especially if you have other health problems or are taking other medicines.    You may use over-the-counter medicines such as acetaminophen, ibuprofen, or naproxen to control pain, unless another pain medicine was prescribed. Talk with your healthcare provider before taking any medicines if you have a chronic condition such as diabetes, liver or kidney disease, stomach ulcers, or digestive bleeding, or are taking blood-thinner medicines.    Be careful if you are given prescription medicines, opioids, or medicine for muscle spasm. They can cause drowsiness, and affect your coordination, reflexes, and judgment. Don't drive or operate heavy machinery when taking these types of medicines. Only take pain medicine as prescribed by your healthcare provider.  Follow-up care  Follow up with your healthcare provider, or as advised. You may need physical therapy or more tests if your symptoms get worse.  If you had X-rays, your healthcare provider may be checking for any broken bones, breaks, or fractures. Bruises and sprains can sometimes hurt as much as a fracture. These injuries can take time to heal fully. If your symptoms don t get better or they get  worse, talk with your healthcare provider. You may need a repeat X-ray or other tests.  Call 911  Call 911 if any of the following occur:    Trouble breathing    Confused    Very drowsy or trouble awakening    Fainting or loss of consciousness    Rapid or very slow heart rate    Loss of bowel or bladder control  When to seek medical advice  Call your healthcare provider right away if any of the following occur:    Pain gets worse or spreads to your arms or legs    Weakness or numbness in one or both arms or legs    Numbness in the groin or genital area  [employer] last reviewed this educational content on 11/1/2019 2000-2021 [employer]. All rights reserved. This information is not intended as a substitute for professional medical care. Always follow your healthcare professional's instructions.           Patient Education     Back Spasm (No Trauma)    Spasm of the back muscles can occur after a sudden forceful twisting or bending such as in a car accident. A spasm can also happen after a simple awkward movement, or after lifting something heavy with poor body positioning. In any case, muscle spasm adds to the pain. Sleeping in an awkward position or on a poor quality mattress can also cause this. Some people respond to emotional stress by tensing the muscles of their back.  Pain that continues may need further assessment or other types of treatment such as physical therapy.  You don't always need X-rays for the first assessment of back pain, unless you had a physical injury such as from a car accident or fall. If your pain continues and doesn't respond to medical treatment, X-rays and other tests may then be done.   Home care    As soon as possible, start sitting or walking again. This will help prevent problems from a long bed rest. These problems include muscle weakness, worsening back stiffness and pain, and blood clots in the legs.    When in bed, try to find a position of comfort. A firm mattress  is best. Try lying flat on your back with pillows under your knees. You can also try lying on your side with your knees bent up toward your chest and a pillow between your knees.    Don't sit for long periods. Also limit car rides and travel. This puts more stress on the lower back than standing or walking.     During the first 24 to 72 hours after an injury or flare-up, put an ice pack on the painful area for 20 minutes, then remove it for 20 minutes. Do this over a period of 60 to 90 minutes, or several times a day. This will reduce swelling and pain. Always wrap ice packs in a thin towel.    You can start with ice, then switch to heat. Heat from a hot shower, hot bath, or heating pad reduces pain and works well for muscle spasms. Put heat on the painful area for 20 minutes, then remove it for 20 minutes. Do this over a period of 60 to 90 minutes, or several times a day. Don't sleep on a heating pad. It can burn or damage skin.    Alternate using ice and heat.    Be aware of safe lifting methods. don't lift anything over 15 pounds until all the pain is gone.  Gentle stretching will help your back heal faster. Do this simple routine 2 to 3 times a day until your back is feeling better.    Lie on your back with your knees bent and both feet on the ground.    Slowly raise your left knee to your chest as you flatten your lower back against the floor. Hold for 20 to 30 seconds.    Relax and repeat the exercise with your right knee.    Do 2 to 3 of these exercises for each leg.    Repeat, hugging both knees to your chest at the same time.    Don't bounce, but use a gentle pull.  Medicines  Talk with your doctor before using medicine, especially if you have other medical problems or are taking other medicines.  You may use over-the-counter medicines such as acetaminophen, ibuprofen, or naprosyn to control pain, unless your healthcare provider prescribed another pain medicine. Talk with your healthcare provider if you  have a chronic condition such as diabetes, liver or kidney disease, stomach ulcer, or digestive bleeding, or are taking blood thinners.  Be careful if you are given prescription pain medicine, opioids, or medicine for muscle spasm. They can cause drowsiness, and affect your coordination, reflexes, and judgment. Don't drive or operate heavy machinery when taking these medicines. Take pain medicine only as prescribed by your healthcare provider.  Follow-up care  Follow up with your doctor, or as advised. You may need physical therapy or more tests.  If X-rays were taken, they may be reviewed by a radiologist. You will be told of any new findings that may affect your care.  Call   Call if any of these occur:    Trouble breathing    Confusion    Drowsiness or trouble awakening    Fainting or loss of consciousness    Rapid or very slow heart rate    Loss of bowel or bladder control  When to seek medical advice  Call your healthcare provider right away if any of these occur:    Pain becomes worse or spreads to your legs    Weakness or numbness in one or both legs    Numbness in the groin or genital area    Fever of 100.4 F (38 C) or higher , or as directed by your healthcare provider    Chills    Burning or pain when passing urine  [employer] last reviewed this educational content on 11/1/2018 2000-2021 [employer]. All rights reserved. This information is not intended as a substitute for professional medical care. Always follow your healthcare professional's instructions.

## 2021-06-08 NOTE — LETTER
My Asthma Action Plan    Name: Mateo Carpenter   YOB: 1995  Date: 6/8/2021   My doctor: ZAC Salazar CNP   My clinic: [facility]        My Rescue Medicine:   Albuterol inhaler (Proair/Ventolin/Proventil HFA)  2-4 puffs EVERY 4 HOURS as needed. Use a spacer if recommended by your provider.   My Asthma Severity:   Intermittent / Exercise Induced  Know your asthma triggers: animal dander and exercise or sports             GREEN ZONE   Good Control    I feel good    No cough or wheeze    Can work, sleep and play without asthma symptoms       Take your asthma control medicine every day.     1. If exercise triggers your asthma, take your rescue medication    15 minutes before exercise or sports, and    During exercise if you have asthma symptoms  2. Spacer to use with inhaler: If you have a spacer, make sure to use it with your inhaler             YELLOW ZONE Getting Worse  I have ANY of these:    I do not feel good    Cough or wheeze    Chest feels tight    Wake up at night   1. Keep taking your Green Zone medications  2. Start taking your rescue medicine:    every 20 minutes for up to 1 hour. Then every 4 hours for 24-48 hours.  3. If you stay in the Yellow Zone for more than 12-24 hours, contact your doctor.  4. If you do not return to the Green Zone in 12-24 hours or you get worse, start taking your oral steroid medicine if prescribed by your provider.           RED ZONE Medical Alert - Get Help  I have ANY of these:    I feel awful    Medicine is not helping    Breathing getting harder    Trouble walking or talking    Nose opens wide to breathe       1. Take your rescue medicine NOW  2. If your provider has prescribed an oral steroid medicine, start taking it NOW  3. Call your doctor NOW  4. If you are still in the Red Zone after 20 minutes and you have not reached your doctor:    Take your rescue medicine again and    Call 911 or go to the emergency room right  away    See your regular doctor within 2 weeks of an Emergency Room or Urgent Care visit for follow-up treatment.          Annual Reminders:  Meet with Asthma Educator,  Flu Shot in the Fall, consider Pneumonia Vaccination for patients with asthma (aged 19 and older).    Pharmacy: [id number] - [place] [address] [address] [facility] [address] AT [facility] [address]    Electronically signed by ZAC Salazar CNP   Date: 06/08/21                    Asthma Triggers  How To Control Things That Make Your Asthma Worse    Triggers are things that make your asthma worse.  Look at the list below to help you find your triggers and   what you can do about them. You can help prevent asthma flare-ups by staying away from your triggers.      Trigger                                                          What you can do   Cigarette Smoke  Tobacco smoke can make asthma worse. Do not allow smoking in your home, car or around you.  Be sure no one smokes at a child s day care or school.  If you smoke, ask your health care provider for ways to help you quit.  Ask family members to quit too.  Ask your health care provider for a referral to Quit Plan to help you quit smoking, or call 7-038-971-PLAN.     Colds, Flu, Bronchitis  These are common triggers of asthma. Wash your hands often.  Don t touch your eyes, nose or mouth.  Get a flu shot every year.     Dust Mites  These are tiny bugs that live in cloth or carpet. They are too small to see. Wash sheets and blankets in hot water every week.   Encase pillows and mattress in dust mite proof covers.  Avoid having carpet if you can. If you have carpet, vacuum weekly.   Use a dust mask and HEPA vacuum.   Pollen and Outdoor Mold  Some people are allergic to trees, grass, or weed pollen, or molds. Try to keep your windows closed.  Limit time out doors when pollen count is high.   Ask you health care provider about taking medicine during allergy season.     Animal  Dander  Some people are allergic to skin flakes, urine or saliva from pets with fur or feathers. Keep pets with fur or feathers out of your home.    If you can t keep the pet outdoors, then keep the pet out of your bedroom.  Keep the bedroom door closed.  Keep pets off cloth furniture and away from stuffed toys.     Mice, Rats, and Cockroaches  Some people are allergic to the waste from these pests.   Cover food and garbage.  Clean up spills and food crumbs.  Store grease in the refrigerator.   Keep food out of the bedroom.   Indoor Mold  This can be a trigger if your home has high moisture. Fix leaking faucets, pipes, or other sources of water.   Clean moldy surfaces.  Dehumidify basement if it is damp and smelly.   Smoke, Strong Odors, and Sprays  These can reduce air quality. Stay away from strong odors and sprays, such as perfume, powder, hair spray, paints, smoke incense, paint, cleaning products, candles and new carpet.   Exercise or Sports  Some people with asthma have this trigger. Be active!  Ask your doctor about taking medicine before sports or exercise to prevent symptoms.    Warm up for 5-10 minutes before and after sports or exercise.     Other Triggers of Asthma  Cold air:  Cover your nose and mouth with a scarf.  Sometimes laughing or crying can be a trigger.  Some medicines and food can trigger asthma.

## 2021-06-08 NOTE — PROGRESS NOTES
Assessment & Plan     (M54.5) Acute right-sided low back pain without sciatica  (primary encounter diagnosis)  Comment: Right sided lumbar pain. Prescribed Naproxen 500 mg for pain and inflammation, Flexeril 5 mg for muscle spasms, lumbar brace for lumbar support, and PT to strengthen low back and surrounding muscles   Plan: [facility] REFERRAL, naproxen (NAPROSYN)         500 MG tablet, cyclobenzaprine (FLEXERIL) 5 MG         tablet, Neck/Shoulder/Back/Abdomen Order for         DME - ONLY FOR DME          (J45.20) Mild intermittent asthma without complication  Comment: exacerbated by exercise and pet dander. Reports using 3-5 times/month. States that asthma is otherwise well controlled. Given AAP and ACT today.   Plan: albuterol (PROAIR HFA/PROVENTIL HFA/VENTOLIN         HFA) 108 (90 Base) MCG/ACT inhaler,         PNEUMOCOCCAL VACCINE,ADULT,SQ OR IM ([id number])         (Z23) Need for pneumococcal vaccination  Comment: Pt is at risk due to hx of asthma. Pt consented to vaccine.   Plan: PNEUMOCOCCAL VACCINE,ADULT,SQ OR IM ([id number])         See Patient Instructions    Return in about 6 weeks (around 7/20/2021), or if symptoms worsen or fail to improve.    Maura Moreno, JENELLE-S ([facility])    I very much appreciated the opportunity to see and assess this patient in the clinic with NP student.  I agree with the above note which summarizes my findings and current recommendations. I have reviewed all diagnostics noted and performed physical exam. Changes were made in the body of the note to achieve one comprehensive document.    ZAC Salazar [facility] ELE Galindo is a 26 year old who presents for the following health issues     HPI   25 y/o male presents with right sided low back pain radiating to right gluteus when he bends over. Pain is described as sharp, intermittent pain rated 9/10 with movement. Pain is 7/10 at rest. Pt reports being told from  "chiropractor that he has a \"tilted pelvis\". Pt reports running on the treadmill on Saturday (06/05/21) and having right sided low back pain after his run. Pt reports stretching and walking has helped temporarily. Tylenol and NSAIDs have not been helpful. Bending at the waist and twisting makes the pain worse.     Pt has hx of asthma-exacerbated by exercise and pet dander. Requesting albuterol inhaler refill. 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   History of back problems: no prior back problems  Any previous MRI or X-rays: None  Sees a specialist for back pain: No  Alleviating factors:   Improved by: None     Precipitating factors:  Worsened by: Bending, Standing and Sitting  Therapies tried and outcome: walking, ice and massage/stretching gives temporary relief     Accompanying Signs & Symptoms:  Risk of Fracture: None  Risk of Cauda Equina: None  Risk of Infection: None  Risk of Cancer: None  Risk of Ankylosing Spondylitis: Onset at age <35, male, AND morning back stiffness  no   [id number]}    Asthma Follow-Up    Was ACT completed today?    Yes    ACT Total Scores 6/8/2021   ACT TOTAL SCORE -   ASTHMA ER VISITS -   ASTHMA HOSPITALIZATIONS -   ACT TOTAL SCORE (Goal Greater than or Equal to 20) 24   In the past 12 months, how many times did you visit the emergency room for your asthma without being admitted to the hospital? 0   In the past 12 months, how many times were you hospitalized overnight because of your asthma? 0          How many days per week do you miss taking your asthma controller medication?  " "0    Please describe any recent triggers for your asthma: animal dander and exercise or sports    Have you had any Emergency Room Visits, Urgent Care Visits, or Hospital Admissions since your last office visit?  No      Review of Systems   Constitutional, HEENT, cardiovascular, pulmonary, gi and gu systems are negative, except as otherwise noted.      Pain with neck flexion (ROM 45 degrees). Pain with back flexion (ROM 30 degrees) and extension (ROM 30 degrees). Pain with bending to left and right side-to-side (20 degrees). No pain noted with twisting torso side-to-side.       Objective    BP (!) 151/82 (BP Location: Right arm, Patient Position: Sitting, Cuff Size: Adult Large)   Pulse 75   Temp 98  F (36.7  C) (Oral)   Resp 18   Ht 1.778 m (5' 10\")   Wt 115.7 kg (255 lb)   SpO2 97%   BMI 36.59 kg/m    Body mass index is 36.59 kg/m .     Physical Exam   GENERAL: healthy, alert and no distress  EYES: Eyes grossly normal to inspection, PERRL and conjunctivae and sclerae normal  RESP: lungs clear to auscultation - no rales, rhonchi or wheezes  CV: regular rate and rhythm, normal S1 S2, no S3 or S4, no murmur, click or rub, no peripheral edema and peripheral pulses strong  MS: Negative straight leg raise. No tenderness noted upon palpation of cervical and thoracic spine and surrounding muscles. Pain noted upon palpation of right sided lumbar region. Pain did not enter sacrum. No gross musculoskeletal defects noted, no edema noted.   SKIN: no suspicious lesions, rashes, or bruises   NEURO: Normal strength and tone, mentation intact and speech normal. Reflexes intact  PSYCH: mentation appears normal, affect normal/bright          "

## 2021-06-09 ASSESSMENT — ASTHMA QUESTIONNAIRES: ACT_TOTALSCORE: 24

## 2021-06-22 ENCOUNTER — THERAPY VISIT (OUTPATIENT)
Dept: PHYSICAL THERAPY | Facility: CLINIC | Age: 26
End: 2021-06-22
Attending: NURSE PRACTITIONER
Payer: COMMERCIAL

## 2021-06-22 DIAGNOSIS — M54.50 ACUTE RIGHT-SIDED LOW BACK PAIN WITHOUT SCIATICA: ICD-10-CM

## 2021-06-22 PROCEDURE — 97161 PT EVAL LOW COMPLEX 20 MIN: CPT | Mod: GP | Performed by: PHYSICAL THERAPIST

## 2021-06-22 PROCEDURE — 97112 NEUROMUSCULAR REEDUCATION: CPT | Mod: GP | Performed by: PHYSICAL THERAPIST

## 2021-06-22 PROCEDURE — 97110 THERAPEUTIC EXERCISES: CPT | Mod: GP | Performed by: PHYSICAL THERAPIST

## 2021-06-22 NOTE — PROGRESS NOTES
Physical Therapy Initial Evaluation  Subjective:  The history is provided by the patient. No  was used.   Patient Health History  Mateo Carpenter being seen for LBP.     Date of Onset: June 12, 2021.   Problem occurred: treadmill x 1 mile run and walk   Pain score: ranges 0-4/10.  General health as reported by patient is good.  Pertinent medical history includes: asthma.   Red flags:  None as reported by patient.  Medical allergies: none.   Surgeries include:  None.    Current medications:  Anti-inflammatory.    Current occupation is Student .   Primary job tasks include:  Computer work and prolonged sitting.                  Therapist Generated HPI Evaluation         Type of problem:  Lumbar.    This is a new condition.  Condition occurred with:  Other reason.  Where condition occurred: at home.  Patient reports pain:  Lumbar spine right.  Pain quality: Throbbing. and is intermittent.  Pain radiates to:  No radiation. Pain is worse in the A.M. (stiffness).  Since onset symptoms are gradually improving.  Exacerbated by: stiffens with sleeping/running and biking.  Relieved by: walking and stretching.      Barriers include:  None as reported by patient.                        Objective:  Standing Alignment:        Lumbar:  Normal            Gait:    Gait Type:  Normal   Assistive Devices:  None    Non-Weight Bearing:  normal             Flexibility/Screens:   Positive screens:  Lumbar    Lower Extremity:  Decreased left lower extremity flexibility:Hamstrings    Decreased right lower extremity flexibility:  Hamstrings               Lumbar/SI Evaluation  ROM:    AROM Lumbar:   Flexion:          Hands to ankles with increased pain form 0/10 to 2/10 with reps  Ext:                    WNL w/o change   Side Bend:        Left:  WNL w/o pain    Right:  WNL w/o pain   Rotation:           Left:     Right:   Side Glide:        Left:     Right:         Strength: Fair Core Strength  Lumbar Myotomes:   normal            Lumbar DTR's:  not assessed      Cord Signs:  not assessed    Lumbar Dermtomes:  not assessed                Neural Tension/Mobility:  Lumbar:  Normal        Lumbar Palpation:  normal      Functional Tests:  not assessed        Lumbar Provocation:      Left negative with:  PROM hip    Right negative with:  PROM hip    SI joint/Sacrum:          Left negative at:    Squish    Right negative at:  Squish                                                 General     ROS    Assessment/Plan:    Patient is a 26 year old male with lumbar complaints.    Patient has the following significant findings with corresponding treatment plan.                Diagnosis 1:  Acute R LBP w/o sciatica   Pain -  manual therapy, self management, education, directional preference exercise and home program  Decreased ROM/flexibility - manual therapy, therapeutic exercise and home program  Decreased strength - therapeutic exercise, therapeutic activities and home program  Inflammation - self management/home program  Impaired muscle performance - neuro re-education and home program  Decreased function - therapeutic activities and home program    Therapy Evaluation Codes:   1) History comprised of:   Personal factors that impact the plan of care:      None.    Comorbidity factors that impact the plan of care are:      Asthma.     Medications impacting care: Anti-inflammatory.  2) Examination of Body Systems comprised of:   Body structures and functions that impact the plan of care:      Lumbar spine.   Activity limitations that impact the plan of care are:      Sleeping.  3) Clinical presentation characteristics are:   Stable/Uncomplicated.  4) Decision-Making    Low complexity using standardized patient assessment instrument and/or measureable assessment of functional outcome.  Cumulative Therapy Evaluation is: Low complexity.    Previous and current functional limitations:  (See Goal Flow Sheet for this information)    Short term  and Long term goals: (See Goal Flow Sheet for this information)     Communication ability:  Patient appears to be able to clearly communicate and understand verbal and written communication and follow directions correctly.  Treatment Explanation - The following has been discussed with the patient:   RX ordered/plan of care  Anticipated outcomes  Possible risks and side effects  This patient would benefit from PT intervention to resume normal activities.   Rehab potential is good.    Frequency:  1 X week, once daily  Duration:  for 6 weeks  Discharge Plan:  Achieve all LTG.  Independent in home treatment program.  Return to previous functional level by discharge.  Reach maximal therapeutic benefit.    Please refer to the daily flowsheet for treatment today, total treatment time and time spent performing 1:1 timed codes.

## 2021-06-30 ENCOUNTER — MYC MEDICAL ADVICE (OUTPATIENT)
Dept: FAMILY MEDICINE | Facility: CLINIC | Age: 26
End: 2021-06-30

## 2021-06-30 DIAGNOSIS — J45.20 MILD INTERMITTENT ASTHMA WITHOUT COMPLICATION: Primary | ICD-10-CM

## 2021-07-01 ENCOUNTER — NURSE TRIAGE (OUTPATIENT)
Dept: NURSING | Facility: CLINIC | Age: 26
End: 2021-07-01

## 2021-07-01 NOTE — TELEPHONE ENCOUNTER
Triage Call:   Patient was warm transferred from scheduling. Patient is having moderate sob. Chest pain/tightness when take deep breaths or when waking up from laying down. Cold symptoms started about 1 week ago. Monday and Tuesday severe sob and fever. Patient stated he has a Hx of asthma and was using his inhaler a lot. Body aches. Patient stated he has had covid before and this feels the same but is lasting longer. Per protocol guidelines patient was advised to be seen in the ED now. Patient stated understanding and will go to the nearest ED now.     COVID 19 Nurse Triage Plan/Patient Instructions    Please be aware that novel coronavirus (COVID-19) may be circulating in the community. If you develop symptoms such as fever, cough, or SOB or if you have concerns about the presence of another infection including coronavirus (COVID-19), please contact your health care provider or visit [email or web address].     Disposition/Instructions    ED Visit recommended. Follow protocol based instructions.     Bring Your Own Device:  Please also bring your smart device(s) (smart phones, tablets, laptops) and their charging cables for your personal use and to communicate with your care team during your visit.    Thank you for taking steps to prevent the spread of this virus.  o Limit your contact with others.  o Wear a simple mask to cover your cough.  o Wash your hands well and often.    Resources    [facility]: About COVID-19: [email or web address]    CDC: What to Do If You're Sick: www.cdc.gov/coronavirus/2019-ncov/about/steps-when-sick.html    CDC: Ending Home Isolation: www.cdc.gov/coronavirus/2019-ncov/hcp/disposition-in-home-patients.html     CDC: Caring for Someone: www.cdc.gov/coronavirus/2019-ncov/if-you-are-sick/care-for-someone.html     [facility]: Interim Guidance for Hospital Discharge to Home: www.health.[facility].[place]/diseases/coronavirus/hcp/hospdischarge.pdf    [facility]  trials (COVID-19 research studies): [email or web address]-clinical-trials     Below are the COVID-19 hotlines at the [place] Department of Health ([facility]). Interpreters are available.   o For health questions: Call 434-968-6200 or 1-139.316.6954 (7 a.m. to 7 p.m.)  o For questions about schools and childcare: Call 476-594-4320 or 1-580.526.6625 (7 a.m. to 7 p.m.)       Tiffany Arthur RN Nursing Advisor 7/1/2021 4:17 PM     Reason for Disposition    MODERATE difficulty breathing (e.g., speaks in phrases, SOB even at rest, pulse 100-120)    Additional Information    Negative: SEVERE difficulty breathing (e.g., struggling for each breath, speaks in single words)    Negative: Difficult to awaken or acting confused (e.g., disoriented, slurred speech)    Negative: Bluish (or gray) lips or face now    Negative: Shock suspected (e.g., cold/pale/clammy skin, too weak to stand, low BP, rapid pulse)    Negative: Sounds like a life-threatening emergency to the triager    Negative: [1] COVID-19 exposure AND [2] has not completed COVID-19 vaccine series AND [3] no symptoms    Negative: [1] COVID-19 exposure AND [2] completed COVID-19 vaccine series (fully vaccinated) AND [3] no symptoms    Negative: COVID-19 vaccine reaction suspected (e.g., fever, headache, muscle aches) occurring during days 1-3 after getting vaccine    Negative: COVID-19 vaccine, questions about    Negative: [1] COVID-19 vaccine series completed (fully vaccinated) in past 3 months AND [2] new-onset of COVID-19 symptoms BUT [3] no known exposure    Negative: [1] Had lab test confirmed COVID-19 infection within last 3 monthsAND [2] new-onset of COVID-19 symptoms BUT [3] no known exposure    Negative: [1] Lives with someone known to have influenza (flu test positive) AND [2] flu-like symptoms (e.g., cough, runny nose, sore throat, SOB; with or without fever)    Negative: [1] Adult with possible COVID-19 symptoms AND [2] triager concerned about severity of  symptoms or other causes    Negative: COVID-19 and breastfeeding, questions about    Negative: SEVERE or constant chest pain or pressure (Exception: mild central chest pain, present only when coughing)    Protocols used: CORONAVIRUS (COVID-19) DIAGNOSED OR [id number] 3.25

## 2021-07-02 ENCOUNTER — OFFICE VISIT (OUTPATIENT)
Dept: URGENT CARE | Facility: URGENT CARE | Age: 26
End: 2021-07-02
Payer: COMMERCIAL

## 2021-07-02 ENCOUNTER — ANCILLARY PROCEDURE (OUTPATIENT)
Dept: GENERAL RADIOLOGY | Facility: CLINIC | Age: 26
End: 2021-07-02
Attending: PHYSICIAN ASSISTANT
Payer: COMMERCIAL

## 2021-07-02 VITALS
TEMPERATURE: 97 F | OXYGEN SATURATION: 100 % | DIASTOLIC BLOOD PRESSURE: 86 MMHG | HEART RATE: 72 BPM | SYSTOLIC BLOOD PRESSURE: 136 MMHG

## 2021-07-02 DIAGNOSIS — R05.9 COUGH: Primary | ICD-10-CM

## 2021-07-02 DIAGNOSIS — R07.89 ATYPICAL CHEST PAIN: ICD-10-CM

## 2021-07-02 LAB
ALBUMIN SERPL-MCNC: 3.8 G/DL (ref 3.4–5)
ALP SERPL-CCNC: 63 U/L (ref 40–150)
ALT SERPL W P-5'-P-CCNC: 81 U/L (ref 0–70)
ANION GAP SERPL CALCULATED.3IONS-SCNC: 7 MMOL/L (ref 3–14)
AST SERPL W P-5'-P-CCNC: 30 U/L (ref 0–45)
BASOPHILS # BLD AUTO: 0 10E9/L (ref 0–0.2)
BASOPHILS NFR BLD AUTO: 0.3 %
BILIRUB SERPL-MCNC: 0.3 MG/DL (ref 0.2–1.3)
BUN SERPL-MCNC: 16 MG/DL (ref 7–30)
CALCIUM SERPL-MCNC: 8.6 MG/DL (ref 8.5–10.1)
CHLORIDE SERPL-SCNC: 108 MMOL/L (ref 94–109)
CO2 SERPL-SCNC: 25 MMOL/L (ref 20–32)
CREAT SERPL-MCNC: 0.84 MG/DL (ref 0.66–1.25)
DIFFERENTIAL METHOD BLD: NORMAL
EOSINOPHIL # BLD AUTO: 0.4 10E9/L (ref 0–0.7)
EOSINOPHIL NFR BLD AUTO: 6.3 %
ERYTHROCYTE [DISTWIDTH] IN BLOOD BY AUTOMATED COUNT: 11.8 % (ref 10–15)
GFR SERPL CREATININE-BSD FRML MDRD: >90 ML/MIN/{1.73_M2}
GLUCOSE SERPL-MCNC: 93 MG/DL (ref 70–99)
HCT VFR BLD AUTO: 47.3 % (ref 40–53)
HGB BLD-MCNC: 15.7 G/DL (ref 13.3–17.7)
LYMPHOCYTES # BLD AUTO: 2.3 10E9/L (ref 0.8–5.3)
LYMPHOCYTES NFR BLD AUTO: 38.5 %
MCH RBC QN AUTO: 30.1 PG (ref 26.5–33)
MCHC RBC AUTO-ENTMCNC: 33.2 G/DL (ref 31.5–36.5)
MCV RBC AUTO: 91 FL (ref 78–100)
MONOCYTES # BLD AUTO: 0.7 10E9/L (ref 0–1.3)
MONOCYTES NFR BLD AUTO: 11.9 %
NEUTROPHILS # BLD AUTO: 2.6 10E9/L (ref 1.6–8.3)
NEUTROPHILS NFR BLD AUTO: 43 %
PLATELET # BLD AUTO: 252 10E9/L (ref 150–450)
POTASSIUM SERPL-SCNC: 4 MMOL/L (ref 3.4–5.3)
PROT SERPL-MCNC: 8 G/DL (ref 6.8–8.8)
RBC # BLD AUTO: 5.21 10E12/L (ref 4.4–5.9)
SARS-COV-2 RNA RESP QL NAA+PROBE: NORMAL
SODIUM SERPL-SCNC: 140 MMOL/L (ref 133–144)
SPECIMEN SOURCE: NORMAL
WBC # BLD AUTO: 6.1 10E9/L (ref 4–11)

## 2021-07-02 PROCEDURE — 71046 X-RAY EXAM CHEST 2 VIEWS: CPT | Performed by: RADIOLOGY

## 2021-07-02 PROCEDURE — U0003 INFECTIOUS AGENT DETECTION BY NUCLEIC ACID (DNA OR RNA); SEVERE ACUTE RESPIRATORY SYNDROME CORONAVIRUS 2 (SARS-COV-2) (CORONAVIRUS DISEASE [COVID-19]), AMPLIFIED PROBE TECHNIQUE, MAKING USE OF HIGH THROUGHPUT TECHNOLOGIES AS DESCRIBED BY CMS-2020-01-R: HCPCS | Performed by: PHYSICIAN ASSISTANT

## 2021-07-02 PROCEDURE — 85025 COMPLETE CBC W/AUTO DIFF WBC: CPT | Performed by: PHYSICIAN ASSISTANT

## 2021-07-02 PROCEDURE — U0005 INFEC AGEN DETEC AMPLI PROBE: HCPCS | Performed by: PHYSICIAN ASSISTANT

## 2021-07-02 PROCEDURE — 99214 OFFICE O/P EST MOD 30 MIN: CPT | Performed by: PHYSICIAN ASSISTANT

## 2021-07-02 PROCEDURE — 36415 COLL VENOUS BLD VENIPUNCTURE: CPT | Performed by: PHYSICIAN ASSISTANT

## 2021-07-02 PROCEDURE — 93000 ELECTROCARDIOGRAM COMPLETE: CPT | Performed by: PHYSICIAN ASSISTANT

## 2021-07-02 PROCEDURE — 80053 COMPREHEN METABOLIC PANEL: CPT | Performed by: PHYSICIAN ASSISTANT

## 2021-07-02 RX ORDER — ALBUTEROL SULFATE 0.83 MG/ML
2.5 SOLUTION RESPIRATORY (INHALATION) EVERY 4 HOURS PRN
Qty: 90 ML | Refills: 0 | Status: SHIPPED | OUTPATIENT
Start: 2021-07-02 | End: 2024-03-04

## 2021-07-02 RX ORDER — IBUPROFEN 800 MG/1
800 TABLET, FILM COATED ORAL EVERY 8 HOURS PRN
Qty: 30 TABLET | Refills: 0 | Status: SHIPPED | OUTPATIENT
Start: 2021-07-02

## 2021-07-02 ASSESSMENT — ENCOUNTER SYMPTOMS
FEVER: 0
SHORTNESS OF BREATH: 0
DYSURIA: 0
COUGH: 1
MYALGIAS: 0
DIAPHORESIS: 0
HEADACHES: 0
NAUSEA: 1
CHILLS: 0
SORE THROAT: 0
APPETITE CHANGE: 0
RHINORRHEA: 1

## 2021-07-02 NOTE — PATIENT INSTRUCTIONS
"  Patient Education   After Your COVID-19 (Coronavirus) Test  You have been tested for COVID-19 (coronavirus).   If you'll have surgery in the next few days, we'll let you know ahead of time if you have the virus. Please call your surgeon's office with any questions.  For all other patients: Results are usually available in [employer] within 2 to 3 days.   If you do not have a [employer] account, you'll get a letter in the mail in about 7 to 10 days.   [employer] is often the fastest way to get test results. Please sign up if you do not already have a [employer] account. See the handout Getting COVID-19 Test Results in [employer] for help.  What if my test result is positive?  If your test is positive and you have not viewed your result in [employer], you'll get a phone call with your result. (A positive test means that you have the virus.)     Follow the tips under \"How do I self-isolate?\" below for 10 days (20 days if you have a weak immune system).    You don't need to be retested for COVID-19 before going back to school or work. As long as you're fever-free and feeling better, you can go back to school, work and other activities after waiting the 10 or 20 days.  What if I have questions after I get my results?  If you have questions about your results, please visit our testing website at [email or web address].   After 7 to 10 days, if you have not gotten your results:     Call [phone] ([phone]) and ask to speak with our COVID-19 results team.    If you're being treated at an infusion center: Call your infusion center directly.  What are the symptoms of COVID-19?  Cough, fever and trouble breathing are the most common signs of COVID-19.  Other symptoms can include new headaches, new muscle or body aches, new and unexplained fatigue (feeling very tired), chills, sore throat, congestion (stuffy or runny nose), diarrhea (loose poop), loss of taste or smell, belly pain, and nausea or vomiting " "(feeling sick to your stomach or throwing up).  You may already have symptoms of COVID-19, or they may show up later.  What should I do if I have symptoms?  If you're having surgery: Call your surgeon's office.  For all other patients: Stay home and away from others (self-isolate) until ...    You've had no fever--and no medicine that reduces fever--for 1 full day (24 hours), AND    Other symptoms have gotten better. For example, your cough or breathing has improved, AND    At least 10 days have passed since your symptoms first started.  How do I self-isolate?    Stay in your own room, even for meals. Use your own bathroom if you can.    Stay away from others in your home. No hugging, kissing or shaking hands. No visitors.    Don't go to work, school or anywhere else.    Clean \"high touch\" surfaces often (doorknobs, counters, handles). Use household cleaning spray or wipes. You'll find a full list of  on the EPA website: www.epa.gov/pesticide-registration/list-n-disinfectants-use-against-sars-cov-2.    Cover your mouth and nose with a mask or other face covering to avoid spreading germs.    Wash your hands and face often. Use soap and water.    Caregivers in these groups are at risk for severe illness due to COVID-19:  ? People 65 years and older  ? People who live in a nursing home or long-term care facility  ? People with chronic disease (lung, heart, cancer, diabetes, kidney, liver, immunologic)  ? People who have a weakened immune system, including those who:    Are in cancer treatment    Take medicine that weakens the immune system, such as corticosteroids    Had a bone marrow or organ transplant    Have an immune deficiency    Have poorly controlled HIV or AIDS    Are obese (body mass index of 40 or higher)    Smoke regularly    Caregivers should wear gloves while washing dishes, handling laundry and cleaning bedrooms and bathrooms.    Use caution when washing and drying laundry: Don't shake dirty " laundry and use the warmest water setting that you can.    For more tips on managing your health at home, go to www.cdc.gov/coronavirus/2019-ncov/downloads/10Things.pdf.  How can I take care of myself at home?  1. Get lots of rest. Drink extra fluids (unless a doctor has told you not to).  2. Take Tylenol (acetaminophen) for fever or pain. If you have liver or kidney problems, ask your family doctor if it's OK to take Tylenol.   Adults can take either:  ? 650 mg (two 325 mg pills) every 4 to 6 hours, or   ? 1,000 mg (two 500 mg pills) every 8 hours as needed.  ? Note: Don't take more than 3,000 mg in one day. Acetaminophen is found in many medicines (both prescribed and over-the-counter medicines). Read all labels to be sure you don't take too much.   For children, check the Tylenol bottle for the right dose. The dose is based on the child's age or weight.  3. If you have other health problems (like cancer, heart failure, an organ transplant or severe kidney disease): Call your specialty clinic if you don't feel better in the next 2 days.  4. Know when to call 911. Emergency warning signs include:  ? Trouble breathing or shortness of breath  ? Chest pain or pressure that doesn't go away  ? Feeling confused like you haven't felt before, or not being able to wake up  ? Bluish-colored lips or face  5. If your doctor prescribed a blood thinner medicine: Follow their instructions.  Where can I get more information?    [facility] - About COVID-19:   [email or web address]    CDC - If You're Sick: cdc.gov/coronavirus/2019-ncov/about/steps-when-sick.html    CDC - Ending Home Isolation: www.cdc.gov/coronavirus/2019-ncov/hcp/disposition-in-home-patients.html    CDC - Caring for Someone: www.cdc.gov/coronavirus/2019-ncov/if-you-are-sick/care-for-someone.html    [facility] - Interim Guidance for Hospital Discharge to Home: www.health.[facility].[place]/diseases/coronavirus/hcp/hospdischarge.pdf    [facility]  clinical trials (COVID-19 research studies): clinicalaffairs.[facility]-clinical-trials    Below are the COVID-19 hotlines at the [place] Department of Health ([facility]). Interpreters are available.  ? For health questions: Call 732-243-5479 or 1-739.402.4927 (7 a.m. to 7 p.m.)  ? For questions about schools and childcare: Call 682-653-2302 or 1-428.577.5425 (7 a.m. to 7 p.m.)    For informational purposes only. Not to replace the advice of your health care provider. Clinically reviewed by Infection Prevention and the [facility] COVID-19 Clinical Team. Copyright   2020 [employer]. All rights reserved. [employer] 365331 - Rev 11/11/20.       Patient Education     Uncertain Causes of Chest Pain    Chest pain can happen for a number of reasons. Sometimes the cause can't be determined. If your condition does not seem serious, and your pain does not appear to be coming from your heart, your healthcare provider may recommend watching it closely. Sometimes the signs of a serious problem take more time to appear. Many problems not related to your heart can cause chest pain. These include:    Musculoskeletal. Costochondritis is an inflammation of the tissues around the ribs that can occur from trauma or overuse injuries, or a strain of the muscles of the chest wall    Respiratory. Pneumonia, collapsed lung (pneumothorax), or inflammation of the lining of the chest and lungs (pleurisy)    Gastrointestinal. Esophageal reflux, heartburn, ulcers, or gallbladder disease    Anxiety and panic disorders    Nerve compression and inflammation    Rare miscellaneous problems such as aortic aneurysm (a swelling of the large artery coming out of the heart) or pulmonary embolism (a blood clot in the lungs)  Home care  After your visit, follow these recommendations:    Rest today and avoid strenuous activity.    Take any prescribed medicine as directed.    Be aware of any recurrent chest pain and notice any  changes  Follow-up care  Follow up with your healthcare provider if you do not start to feel better within 24 hours, or as advised.  Call 911  Call 911 if any of these occur:    A change in the type of pain: if it feels different, becomes more severe, lasts longer, or begins to spread into your shoulder, arm, neck, jaw or back    Shortness of breath or increased pain with breathing    Weakness, dizziness, or fainting    Rapid heart beat    Crushing sensation in your chest  When to seek medical advice  Call your healthcare provider right away if any of the following occur:    Cough with dark colored sputum (phlegm) or blood    Fever of 100.4 F (38 C) or higher, or as directed by your healthcare provider    Swelling, pain or redness in one leg  [employer] last reviewed this educational content on 5/1/2018 2000-2021 [employer]. All rights reserved. This information is not intended as a substitute for professional medical care. Always follow your healthcare professional's instructions.

## 2021-07-02 NOTE — PROGRESS NOTES
SUBJECTIVE:   Mateo Carpenter is a 26 year old male presenting with a chief complaint of   Chief Complaint   Patient presents with     Chest Pain     intermittent chest pain and congestion 5 days--pain increases with deep breaths       He is an established patient of [facility].  CP x 7-8 days ago.  Pain is worse in morning and unnoticeable by evening.  Pain radiates through to back.  No SOB.   Stabbing in the morning.  3-4/10.  Movement improves pain.  Coughing improving.  Last chills was 2 days ago.   Rhinorrhea is improving - clear/yellow.  Patient had covid vaccination.  COVID positive for 2/2020.  No hx of intubation.  One hospitalization as a child for asthma    Treatment:  Nothing for CP.      PMHx:  Asthma  Medications:  Albuterol prn  Social:  etoh social  Surgeries:  None  Allergies:  None  Fam Hx:  Grandmother with HTN.  Thinks Grandfather had aortic aneurysm (?)      Review of Systems   Constitutional: Negative for appetite change, chills, diaphoresis and fever.   HENT: Positive for congestion and rhinorrhea. Negative for ear pain and sore throat.    Respiratory: Positive for cough. Negative for shortness of breath.    Cardiovascular: Positive for chest pain.   Gastrointestinal: Positive for nausea.   Genitourinary: Negative for dysuria.   Musculoskeletal: Negative for myalgias.   Skin: Negative for rash.   Neurological: Negative for headaches.   All other systems reviewed and are negative.      Past Medical History:   Diagnosis Date     Allergic rhinitis due to animal dander 3/99 skin tests pos. for cat/dog only--tests per Dr. Matos     Asthma, mild persistent      Diagnostic skin and sensitization tests 3/99 skin tests pos. for cat/dog only     Obesity (BMI 30.0-34.9)      Family History   Problem Relation Age of Onset     Gastrointestinal Disease Mother         Fatty Liver      Asthma Father      Alzheimer Disease Maternal Grandmother         Dementia     Thyroid Disease Maternal Grandmother       Circulatory Maternal Grandfather         AAA     Respiratory Paternal Grandmother         raul      Current Outpatient Medications   Medication Sig Dispense Refill     albuterol (PROAIR HFA/PROVENTIL HFA/VENTOLIN HFA) 108 (90 Base) MCG/ACT inhaler INHALE 2 PUFFS INTO THE LUNGS EVERY 4 HOURS AS NEEDED FOR SHORTNESS OF BREATH OR DIFFICULT BREATHING 18 g 1     azelastine (ASTELIN) 0.1 % nasal spray Spray 1 spray into both nostrils 2 times daily 2 Bottle 3     BIOTIN PO        multivitamin w/minerals (MULTI-VITAMIN) tablet Take 1 tablet by mouth daily       Omega-3 Fatty Acids (FISH OIL PO)        triamcinolone (KENALOG) 0.1 % external ointment Apply topically 2 times daily 30 g 0     albuterol (PROAIR HFA/PROVENTIL HFA/VENTOLIN HFA) 108 (90 Base) MCG/ACT inhaler Inhale 2 puffs into the lungs every 6 hours 18 g 1     naproxen (NAPROSYN) 500 MG tablet Take 1 tablet (500 mg) by mouth 2 times daily (with meals) (Patient not taking: Reported on 7/2/2021) 60 tablet 0     Social History     Tobacco Use     Smoking status: Never Smoker     Smokeless tobacco: Never Used   Substance Use Topics     Alcohol use: Yes       OBJECTIVE  /86   Pulse 72   Temp 97  F (36.1  C) (Tympanic)   SpO2 100%     Physical Exam  Vitals signs and nursing note reviewed.   Constitutional:       General: He is not in acute distress.     Appearance: Normal appearance. He is normal weight. He is not ill-appearing or toxic-appearing.   HENT:      Head: Normocephalic and atraumatic.      Right Ear: Tympanic membrane, ear canal and external ear normal.      Left Ear: Tympanic membrane, ear canal and external ear normal.      Mouth/Throat:      Mouth: Mucous membranes are moist.      Pharynx: Oropharynx is clear.   Eyes:      Extraocular Movements: Extraocular movements intact.      Conjunctiva/sclera: Conjunctivae normal.   Neck:      Musculoskeletal: Normal range of motion and neck supple.   Cardiovascular:      Rate and Rhythm: Normal rate  and regular rhythm.      Pulses: Normal pulses.      Heart sounds: Normal heart sounds.   Pulmonary:      Effort: Pulmonary effort is normal. No respiratory distress.      Breath sounds: Normal breath sounds. No stridor. No wheezing, rhonchi or rales.   Chest:      Chest wall: No tenderness.   Skin:     General: Skin is warm and dry.   Neurological:      General: No focal deficit present.      Mental Status: He is alert.   Psychiatric:         Mood and Affect: Mood normal.         Behavior: Behavior normal.         Labs:  Results for orders placed or performed in visit on 07/02/21 (from the past 24 hour(s))   XR Chest 2 Views    Narrative    XR CHEST 2 VW 7/2/2021 11:52 AM    HISTORY: Cough    COMPARISON: Chest x-ray 9/14/2020      Impression    IMPRESSION: No acute findings. The lungs are clear and there are no  pleural effusions. Normal heart size.    MAXIMILIAN OLIVER MD          SYSTEM ID:  [id number]       X-Ray was done, my findings are:  wnl  Xrays personally viewed by myself.  EKG:  NSR - HR 82    ASSESSMENT:      ICD-10-CM    1. Cough  R05 XR Chest 2 Views     CBC with platelets and differential     Comprehensive metabolic panel (BMP + Alb, Alk Phos, ALT, AST, Total. Bili, TP)     Symptomatic COVID-19 Virus (Coronavirus) by PCR        Medical Decision Making:    Differential Diagnosis:  Costochondritis, pneumonia, COVID    Serious Comorbid Conditions:  Adult:  Asthma    PLAN:  Discussed and recommended CDC guidelines for self isolation.  COVID test pending.  Rx for ibuprofen 800.  Discussed reasons to seek immediate medical care, expectations and f/u.  Increase fluid intake.  See PCP if persists.  CMP pending.  Will call with any abnormal results.      Followup:    If not improving or if condition worsens, follow up with your Primary Care Provider, If not improving or if conditions worsens over the next 12-24 hours, go to the Emergency Department    Patient Instructions     Patient Education   After Your  "COVID-19 (Coronavirus) Test  You have been tested for COVID-19 (coronavirus).   If you'll have surgery in the next few days, we'll let you know ahead of time if you have the virus. Please call your surgeon's office with any questions.  For all other patients: Results are usually available in [employer] within 2 to 3 days.   If you do not have a [employer] account, you'll get a letter in the mail in about 7 to 10 days.   [employer] is often the fastest way to get test results. Please sign up if you do not already have a [employer] account. See the handout Getting COVID-19 Test Results in [employer] for help.  What if my test result is positive?  If your test is positive and you have not viewed your result in [employer], you'll get a phone call with your result. (A positive test means that you have the virus.)     Follow the tips under \"How do I self-isolate?\" below for 10 days (20 days if you have a weak immune system).    You don't need to be retested for COVID-19 before going back to school or work. As long as you're fever-free and feeling better, you can go back to school, work and other activities after waiting the 10 or 20 days.  What if I have questions after I get my results?  If you have questions about your results, please visit our testing website at [email or web address].   After 7 to 10 days, if you have not gotten your results:     Call [phone] ([phone]) and ask to speak with our COVID-19 results team.    If you're being treated at an infusion center: Call your infusion center directly.  What are the symptoms of COVID-19?  Cough, fever and trouble breathing are the most common signs of COVID-19.  Other symptoms can include new headaches, new muscle or body aches, new and unexplained fatigue (feeling very tired), chills, sore throat, congestion (stuffy or runny nose), diarrhea (loose poop), loss of taste or smell, belly pain, and nausea or vomiting (feeling sick to your stomach or " "throwing up).  You may already have symptoms of COVID-19, or they may show up later.  What should I do if I have symptoms?  If you're having surgery: Call your surgeon's office.  For all other patients: Stay home and away from others (self-isolate) until ...    You've had no fever--and no medicine that reduces fever--for 1 full day (24 hours), AND    Other symptoms have gotten better. For example, your cough or breathing has improved, AND    At least 10 days have passed since your symptoms first started.  How do I self-isolate?    Stay in your own room, even for meals. Use your own bathroom if you can.    Stay away from others in your home. No hugging, kissing or shaking hands. No visitors.    Don't go to work, school or anywhere else.    Clean \"high touch\" surfaces often (doorknobs, counters, handles). Use household cleaning spray or wipes. You'll find a full list of  on the EPA website: www.epa.gov/pesticide-registration/list-n-disinfectants-use-against-sars-cov-2.    Cover your mouth and nose with a mask or other face covering to avoid spreading germs.    Wash your hands and face often. Use soap and water.    Caregivers in these groups are at risk for severe illness due to COVID-19:  ? People 65 years and older  ? People who live in a nursing home or long-term care facility  ? People with chronic disease (lung, heart, cancer, diabetes, kidney, liver, immunologic)  ? People who have a weakened immune system, including those who:    Are in cancer treatment    Take medicine that weakens the immune system, such as corticosteroids    Had a bone marrow or organ transplant    Have an immune deficiency    Have poorly controlled HIV or AIDS    Are obese (body mass index of 40 or higher)    Smoke regularly    Caregivers should wear gloves while washing dishes, handling laundry and cleaning bedrooms and bathrooms.    Use caution when washing and drying laundry: Don't shake dirty laundry and use the warmest water " setting that you can.    For more tips on managing your health at home, go to www.cdc.gov/coronavirus/2019-ncov/downloads/10Things.pdf.  How can I take care of myself at home?  1. Get lots of rest. Drink extra fluids (unless a doctor has told you not to).  2. Take Tylenol (acetaminophen) for fever or pain. If you have liver or kidney problems, ask your family doctor if it's OK to take Tylenol.   Adults can take either:  ? 650 mg (two 325 mg pills) every 4 to 6 hours, or   ? 1,000 mg (two 500 mg pills) every 8 hours as needed.  ? Note: Don't take more than 3,000 mg in one day. Acetaminophen is found in many medicines (both prescribed and over-the-counter medicines). Read all labels to be sure you don't take too much.   For children, check the Tylenol bottle for the right dose. The dose is based on the child's age or weight.  3. If you have other health problems (like cancer, heart failure, an organ transplant or severe kidney disease): Call your specialty clinic if you don't feel better in the next 2 days.  4. Know when to call 911. Emergency warning signs include:  ? Trouble breathing or shortness of breath  ? Chest pain or pressure that doesn't go away  ? Feeling confused like you haven't felt before, or not being able to wake up  ? Bluish-colored lips or face  5. If your doctor prescribed a blood thinner medicine: Follow their instructions.  Where can I get more information?    [facility] - About COVID-19:   [email or web address]    CDC - If You're Sick: cdc.gov/coronavirus/2019-ncov/about/steps-when-sick.html    CDC - Ending Home Isolation: www.cdc.gov/coronavirus/2019-ncov/hcp/disposition-in-home-patients.html    CDC - Caring for Someone: www.cdc.gov/coronavirus/2019-ncov/if-you-are-sick/care-for-someone.html    [facility] - Interim Guidance for Hospital Discharge to Home: www.health.[facility].[place].us/diseases/coronavirus/hcp/hospdischarge.pdf    [facility] clinical trials (COVID-19 research  studies): [email or web address]-[email or web address]    Below are the COVID-19 hotlines at the [place] Department of Health ([facility]). Interpreters are available.  ? For health questions: Call 859-831-0136 or 1-696.609.9494 (7 a.m. to 7 p.m.)  ? For questions about schools and childcare: Call 981-344-6707 or 1-211.976.4225 (7 a.m. to 7 p.m.)    For informational purposes only. Not to replace the advice of your health care provider. Clinically reviewed by Infection Prevention and the [facility] COVID-19 Clinical Team. Copyright   2020 [facility]. All rights reserved. [employer] 395005 - Rev 11/11/20.

## 2021-07-02 NOTE — TELEPHONE ENCOUNTER
See patient's mychart request for neb refills to have on hand in case needed.    I see patient was in Urgent Care today for cough.    I see other RN inder'd up past nebulier solution Rx.    Patient directed this request to Freeman Alanis, listed as PCP.    Routing refill request to provider for review/approval because:  Drug not active on patient's medication list  Delia Romero RN  [facility]

## 2021-07-03 LAB
LABORATORY COMMENT REPORT: NORMAL
SARS-COV-2 RNA RESP QL NAA+PROBE: NEGATIVE
SPECIMEN SOURCE: NORMAL

## 2021-07-06 ENCOUNTER — THERAPY VISIT (OUTPATIENT)
Dept: PHYSICAL THERAPY | Facility: CLINIC | Age: 26
End: 2021-07-06
Payer: COMMERCIAL

## 2021-07-06 DIAGNOSIS — M54.50 ACUTE RIGHT-SIDED LOW BACK PAIN WITHOUT SCIATICA: ICD-10-CM

## 2021-07-06 PROCEDURE — 97535 SELF CARE MNGMENT TRAINING: CPT | Mod: GP | Performed by: PHYSICAL THERAPIST

## 2021-07-06 PROCEDURE — 97110 THERAPEUTIC EXERCISES: CPT | Mod: GP | Performed by: PHYSICAL THERAPIST

## 2021-07-06 PROCEDURE — 97112 NEUROMUSCULAR REEDUCATION: CPT | Mod: GP | Performed by: PHYSICAL THERAPIST

## 2021-07-29 ENCOUNTER — THERAPY VISIT (OUTPATIENT)
Dept: PHYSICAL THERAPY | Facility: CLINIC | Age: 26
End: 2021-07-29
Payer: COMMERCIAL

## 2021-07-29 DIAGNOSIS — M54.50 ACUTE RIGHT-SIDED LOW BACK PAIN WITHOUT SCIATICA: ICD-10-CM

## 2021-07-29 PROCEDURE — 97530 THERAPEUTIC ACTIVITIES: CPT | Mod: GP | Performed by: PHYSICAL THERAPIST

## 2021-07-29 PROCEDURE — 97110 THERAPEUTIC EXERCISES: CPT | Mod: GP | Performed by: PHYSICAL THERAPIST

## 2021-07-29 PROCEDURE — 97112 NEUROMUSCULAR REEDUCATION: CPT | Mod: GP | Performed by: PHYSICAL THERAPIST

## 2021-09-20 PROBLEM — M54.50 ACUTE RIGHT-SIDED LOW BACK PAIN WITHOUT SCIATICA: Status: RESOLVED | Noted: 2021-06-22 | Resolved: 2021-09-20

## 2021-09-20 NOTE — PROGRESS NOTES
Subjective:  HPI  Physical Exam                    Objective:  System    Physical Exam    General     ROS    Assessment/Plan:    DISCHARGE REPORT    Progress reporting period is from 6/22/21 to 7/29/21.       SUBJECTIVE  Subjective changes noted by patient:  Per SOAP note 7/29/21: Mateo purchased an ebike and biking 5-12 miles 3-4 x week. Not irritating LB. Has not yet attempted run/walk. Played tennis a few times w/o aggravating LB. Overall reports reduction in back pain except when replacing alternator.     Current Pain level: 0/10.     Initial Pain level: 6/10.   Changes in function:  Yes (See Goal flowsheet attached for changes in current functional level)  Adverse reaction to treatment or activity: None    OBJECTIVE  Changes noted in objective findings:  Patient has failed to return to therapy so current objective findings are unknown.  Per SOAP note 7/29/21: Full pain free trunk ROM.       ASSESSMENT/PLAN  Updated problem list and treatment plan: Diagnosis 1:  LBP  Pain -  self management, education and home program  Decreased ROM/flexibility - manual therapy, therapeutic exercise and home program  Decreased strength - therapeutic exercise, therapeutic activities and home program  Inflammation - self management/home program  Impaired muscle performance - neuro re-education and home program  Decreased function - therapeutic activities and home program  STG/LTGs have been met or progress has been made towards goals:  Yes (See Goal flow sheet completed today.)  Assessment of Progress: The patient's condition is improving.  The patient has not returned to therapy. Current status is unknown.  Patient is meeting short term goals and is progressing towards long term goals.  Self Management Plans:  Patient is independent in a home treatment program.  Patient is independent in self management of symptoms.  I have re-evaluated this patient and find that the nature, scope, duration and intensity of the therapy is  appropriate for the medical condition of the patient.  Mateo continues to require the following intervention to meet STG and LTG's:  PT intervention is no longer required to meet STG/LTG.    Recommendations:  This patient is ready to be discharged from therapy and continue their home treatment program.    Please refer to the daily flowsheet for treatment today, total treatment time and time spent performing 1:1 timed codes.

## 2021-09-26 ENCOUNTER — HEALTH MAINTENANCE LETTER (OUTPATIENT)
Age: 26
End: 2021-09-26

## 2021-10-19 ENCOUNTER — E-VISIT (OUTPATIENT)
Dept: FAMILY MEDICINE | Facility: CLINIC | Age: 26
End: 2021-10-19
Payer: COMMERCIAL

## 2021-10-19 DIAGNOSIS — L70.9 ACNE, UNSPECIFIED ACNE TYPE: Primary | ICD-10-CM

## 2021-10-19 PROCEDURE — 99422 OL DIG E/M SVC 11-20 MIN: CPT | Performed by: PHYSICIAN ASSISTANT

## 2021-10-19 RX ORDER — CLINDAMYCIN PHOSPHATE 10 UG/ML
LOTION TOPICAL 2 TIMES DAILY
Qty: 60 ML | Refills: 2 | Status: SHIPPED | OUTPATIENT
Start: 2021-10-19

## 2021-10-19 RX ORDER — DOXYCYCLINE 100 MG/1
100 CAPSULE ORAL DAILY
Qty: 30 CAPSULE | Refills: 2 | Status: SHIPPED | OUTPATIENT
Start: 2021-10-19

## 2021-10-20 NOTE — TELEPHONE ENCOUNTER
Question 10/19/2021  1:22 AM CDT - Filed by Patient   Which of the following do you think you may be experiencing? Acne   Have you had similar symptoms in the past? (Yes I have these symptoms frequently, Yes I have had similar symptoms more than once before, Yes I have had similar symptoms before, No I have never had  these symptoms) Yes, I have had similar symptoms before   How long have you been having these symptoms? (Just today, For a few days, For a week, For one to four weeks, For more than a month) For more than a month   Do any of these apply? None of them apply       [employer] E-Visit Acne    Question 10/19/2021  1:22 AM CDT - Filed by Patient   Where is your acne located? (choose all that apply) Face   UPLOAD PHOTO OPTIONAL Upload from 10/19/2021 1:21 AM      Which of the following applies to you? I often have 15 or fewer pimples   Do you currently have any of the following symptoms related to acne? Red or swollen skin around the pimples    Large (the size of a pea or larger), deep acne that is painful   Have you used any of the following treatments that HAVE WORKED for your acne? None   Have you used any of the following treatments that HAVE NOT WORKED for your acne? None             It is my impression based on the historical events and the physical exam that this is acne vulgaris with possible abscess. There is no surrounding cellulitis.  I do not believe the patient has underlying necrotizing fasciitis.  Will treat with clindamycin topically and doxycycline course. Follow up in 1 week if not improving.        Provider E-Visit time total (minutes): 13

## 2021-10-20 NOTE — PATIENT INSTRUCTIONS
Shahbaz Galindo,    Thank you for allowing [facility] to manage your care.    This looks like a small abscess associated with acne. If you develop worsening/changing symptoms at any time, please call 911 or go to the emergency department for evaluation.    I sent your prescriptions to your pharmacy.    If you have any questions or concerns, please feel free to call us at [phone]    Sincerely,    Freeman Alanis PA-C    Did you know?      You can schedule a video visit for follow-up appointments as well as future appointments for certain conditions.  Please see the below link.     [email or web address]    If you have not already done so,  I encourage you to sign up for [employer] [employer] ([email or web address]).  This will allow you to review your results, securely communicate with a provider, and schedule virtual visits as well.      Patient Education     Abscess (Antibiotic Treatment Only)  An abscess happens when bacteria get trapped under the skin and start to grow. Pus forms inside the abscess as the body responds to the bacteria. An abscess can happen with an insect bite, ingrown hair, blocked oil gland, pimple, cyst, or puncture wound. It is sometimes call a boil.   In the early stages, your wound may be red and tender. For this stage, you may get antibiotics. If the abscess does not get better with antibiotics, it will need to be drained with a small cut.   Home care  These tips will help you care for your abscess at home:    Soak the wound in hot water or apply hot packs (small towel soaked in hot water) to the area for 20 minutes at a time. Do this 3 to 4 times a day, or as instructed. Use a new towel each time. Wash the towels afterward because they may be contaminated with bacteria after use.    Don't cut, squeeze, or pop the boil yourself.    Put antibiotic cream or ointment on the skin 3 to 4 times a day, unless something else was prescribed. Some ointments  include an antibiotic plus a pain reliever.    If your healthcare provider prescribed antibiotics, don't stop taking them until you have finished the medicine or you are told to stop.    You may use an over-the-counter pain medicine to control pain, unless another pain medicine was prescribed. Talk with your provider before taking these medicines if you have chronic liver or kidney disease or ever had a stomach ulcer or digestive bleeding.  Follow-up care  Follow up with your healthcare provider, or as advised. Check your wound each day for the signs that the infection may be getting worse (see below).   When to seek medical advice  Get prompt medical attention if any of these occur:    An increase in redness or swelling    Red streaks in the skin leading away from the abscess    An increase in local pain or swelling    Fever of 100.4 F (38 C) or higher, or as directed by your healthcare provider    Pus or fluid coming from the abscess    Boil returns after getting better  Ting last reviewed this educational content on 8/1/2019 2000-2021 [employer]. All rights reserved. This information is not intended as a substitute for professional medical care. Always follow your healthcare professional's instructions.

## 2021-10-23 ENCOUNTER — OFFICE VISIT (OUTPATIENT)
Dept: URGENT CARE | Facility: URGENT CARE | Age: 26
End: 2021-10-23
Payer: COMMERCIAL

## 2021-10-23 VITALS
HEART RATE: 72 BPM | WEIGHT: 251.8 LBS | OXYGEN SATURATION: 96 % | SYSTOLIC BLOOD PRESSURE: 120 MMHG | BODY MASS INDEX: 36.13 KG/M2 | TEMPERATURE: 98.5 F | DIASTOLIC BLOOD PRESSURE: 70 MMHG

## 2021-10-23 DIAGNOSIS — Q18.1 CYST ON EAR: Primary | ICD-10-CM

## 2021-10-23 PROCEDURE — 99213 OFFICE O/P EST LOW 20 MIN: CPT | Performed by: FAMILY MEDICINE

## 2021-10-23 NOTE — PROGRESS NOTES
Chief complaint: cyst ear    Started 2 months ago   Initially was size of a pea for the longest time  1.5 weeks ago accidentally nicked it  Then progressively increasing in size  It is hurting and causing pressure    Patient did an evisit 3 days ago and was prescribed with clindamycin ointment and po doxy     Nicked it in the shower today and bleeding  Patient wanted it evaluated in person     No fevers or chills chest pain or shortness of breath     He states he does get cysts here and there because of his beard     No Known Allergies    Past Medical History:   Diagnosis Date     Allergic rhinitis due to animal dander 3/99 skin tests pos. for cat/dog only--tests per Dr. Matos     Asthma, mild persistent      Diagnostic skin and sensitization tests 3/99 skin tests pos. for cat/dog only     Obesity (BMI 30.0-34.9)        albuterol (PROAIR HFA/PROVENTIL HFA/VENTOLIN HFA) 108 (90 Base) MCG/ACT inhaler, Inhale 2 puffs into the lungs every 6 hours  albuterol (PROAIR HFA/PROVENTIL HFA/VENTOLIN HFA) 108 (90 Base) MCG/ACT inhaler, INHALE 2 PUFFS INTO THE LUNGS EVERY 4 HOURS AS NEEDED FOR SHORTNESS OF BREATH OR DIFFICULT BREATHING  albuterol (PROVENTIL) (2.5 MG/3ML) 0.083% neb solution, Take 1 vial (2.5 mg) by nebulization every 4 hours as needed for shortness of breath / dyspnea or wheezing  azelastine (ASTELIN) 0.1 % nasal spray, Spray 1 spray into both nostrils 2 times daily  BIOTIN PO,   clindamycin (CLEOCIN T) 1 % external lotion, Apply topically 2 times daily  doxycycline hyclate (VIBRAMYCIN) 100 MG capsule, Take 1 capsule (100 mg) by mouth daily  ibuprofen (ADVIL/MOTRIN) 800 MG tablet, Take 1 tablet (800 mg) by mouth every 8 hours as needed for moderate pain  multivitamin w/minerals (MULTI-VITAMIN) tablet, Take 1 tablet by mouth daily  Omega-3 Fatty Acids (FISH OIL PO),   triamcinolone (KENALOG) 0.1 % external ointment, Apply topically 2 times daily    No current facility-administered medications on file prior to  "visit.      Social History     Tobacco Use     Smoking status: Never Smoker     Smokeless tobacco: Never Used   Substance Use Topics     Alcohol use: Yes     Drug use: No       ROS:  review of systems negative except for noted above.       OBJECTIVE:  /70   Pulse 72   Temp 98.5  F (36.9  C) (Oral)   Wt 114.2 kg (251 lb 12.8 oz)   SpO2 96%   BMI 36.13 kg/m     General:   awake, alert, and cooperative.  NAD.   Head: Normocephalic, atraumatic.  Eyes: Conjunctiva clear,   Neuro: Alert and oriented - normal speech.  MS: Using extremities freely  PSYCH:  Normal affect, normal speech  SKIN: erythema and cystic swelling over the  Jaw angle of the jaw just below the left ear  measuring approximately 1 cm, No purulent discharge, No fluctuation.   There is serous fluid coming out - clear. In the area patient \"nicked\"it     ASSESSMENT:    ICD-10-CM    1. Cyst on ear  Q18.1 Otolaryngology Referral       PLAN:   Draining serous fluid.   Antibiotic ointment and bandaid   Continue doxy   Referred to ENT for follow up given location over skin overlying parotid gland.   Watch for any worsening redness warmth swelling tenderness or inflammation or purulent discharge. If with any of these please come in immediately to be re-evaluated  Please come in immediately also if with any fever or chills  Adverse reactions of medications discussed.  Aware of the risks of GI intolerance, GERD, GI complications and photosensitivity with doxycycline  Over the counter medications discussed.   Aware to come back in if with worsening symptoms or if no relief despite treatment plan  Patient voiced understanding and had no further questions.     Follow up:  Primary care provider or ENT in 3-4 days  Advised about symptoms which might herald more serious problems.        Sena Yen MD          "

## 2021-12-23 DIAGNOSIS — J45.20 MILD INTERMITTENT ASTHMA WITHOUT COMPLICATION: ICD-10-CM

## 2021-12-24 RX ORDER — ALBUTEROL SULFATE 90 UG/1
2 AEROSOL, METERED RESPIRATORY (INHALATION) EVERY 6 HOURS
Qty: 18 G | Refills: 0 | Status: SHIPPED | OUTPATIENT
Start: 2021-12-24 | End: 2022-03-09

## 2021-12-24 NOTE — TELEPHONE ENCOUNTER
Routing refill request to provider for review/approval because:  Patient needs to be seen because:  Due to ACT and asthma follow-up    Brent Kenyon RN  [facility]

## 2022-01-16 ENCOUNTER — HEALTH MAINTENANCE LETTER (OUTPATIENT)
Age: 27
End: 2022-01-16

## 2022-03-07 DIAGNOSIS — J45.20 MILD INTERMITTENT ASTHMA WITHOUT COMPLICATION: ICD-10-CM

## 2022-03-07 NOTE — LETTER
March 30, 2022    Mateo Carpenter    [facility] [place], [place] [id number]        Dear Mateo,      We have tried multiple times to reach you but have been unsuccessful. Please call our clinic at phone [phone] as soon as possible to schedule an appointment with your provider for a follow-up on your medications. This appointment is needed for any additional refills to be approved.      Sincerely,    Daisy Guillen

## 2022-03-09 NOTE — TELEPHONE ENCOUNTER
Adia refill already given, patient over due for asthma follow up and ACT.  Please have patient schedule and route back for refill.     Lashawn Stroud RN

## 2022-03-09 NOTE — TELEPHONE ENCOUNTER
Left a message for Mateo to call the clinic to schedule an appointment. Please help the patient schedule for Medication an appointment.  Belia Chin-  [place]

## 2022-03-30 RX ORDER — ALBUTEROL SULFATE 90 UG/1
2 AEROSOL, METERED RESPIRATORY (INHALATION) EVERY 6 HOURS
Qty: 18 G | Refills: 0 | Status: SHIPPED | OUTPATIENT
Start: 2022-03-30 | End: 2022-05-09

## 2022-03-30 NOTE — TELEPHONE ENCOUNTER
"Routing refill request to provider for review/approval because:  Adia given x1 and patient did not follow up, please advise.     Requested Prescriptions   Pending Prescriptions Disp Refills    albuterol (PROAIR HFA/PROVENTIL HFA/VENTOLIN HFA) 108 (90 Base) MCG/ACT inhaler [Pharmacy Med Name: ALBUTEROL HFA INH(200 PUFFS)18GM] 18 g 0     Sig: INHALE 2 PUFFS INTO THE LUNGS EVERY 6 HOURS        Asthma Maintenance Inhalers - Anticholinergics Failed - 3/30/2022  1:12 PM        Failed - Asthma control assessment score within normal limits in last 6 months     Please review ACT score.           Failed - Recent (6 mo) or future (30 days) visit within the authorizing provider's specialty     Patient had office visit in the last 6 months or has a visit in the next 30 days with authorizing provider or within the authorizing provider's specialty.  See \"Patient Info\" tab in inbasket, or \"Choose Columns\" in Meds & Orders section of the refill encounter.            Passed - Patient is age 12 years or older        Passed - Medication is active on med list       Short-Acting Beta Agonist Inhalers Protocol  Failed - 3/30/2022  1:12 PM        Failed - Asthma control assessment score within normal limits in last 6 months     Please review ACT score.           Failed - Recent (6 mo) or future (30 days) visit within the authorizing provider's specialty     Patient had office visit in the last 6 months or has a visit in the next 30 days with authorizing provider or within the authorizing provider's specialty.  See \"Patient Info\" tab in inbasket, or \"Choose Columns\" in Meds & Orders section of the refill encounter.            Passed - Patient is age 12 or older        Passed - Medication is active on med list              Noemy Oliver RN   [facility]         "

## 2022-05-09 ENCOUNTER — VIRTUAL VISIT (OUTPATIENT)
Dept: FAMILY MEDICINE | Facility: CLINIC | Age: 27
End: 2022-05-09
Payer: COMMERCIAL

## 2022-05-09 DIAGNOSIS — J45.20 MILD INTERMITTENT ASTHMA WITHOUT COMPLICATION: ICD-10-CM

## 2022-05-09 PROCEDURE — 99213 OFFICE O/P EST LOW 20 MIN: CPT | Mod: GT | Performed by: PHYSICIAN ASSISTANT

## 2022-05-09 RX ORDER — FLUTICASONE PROPIONATE AND SALMETEROL 100; 50 UG/1; UG/1
1 POWDER RESPIRATORY (INHALATION) EVERY 12 HOURS
Qty: 60 EACH | Refills: 11 | Status: SHIPPED | OUTPATIENT
Start: 2022-05-09 | End: 2023-05-10

## 2022-05-09 RX ORDER — MONTELUKAST SODIUM 10 MG/1
10 TABLET ORAL AT BEDTIME
Qty: 90 TABLET | Refills: 3 | Status: SHIPPED | OUTPATIENT
Start: 2022-05-09 | End: 2023-05-10

## 2022-05-09 RX ORDER — ALBUTEROL SULFATE 90 UG/1
2 AEROSOL, METERED RESPIRATORY (INHALATION) EVERY 6 HOURS
Qty: 18 G | Refills: 3 | Status: SHIPPED | OUTPATIENT
Start: 2022-05-09 | End: 2024-03-04

## 2022-05-09 ASSESSMENT — ASTHMA QUESTIONNAIRES: ACT_TOTALSCORE: 13

## 2022-05-09 NOTE — PROGRESS NOTES
Mateo is a 27 year old who is being evaluated via a billable video visit.      How would you like to obtain your AVS? MyChart  If the video visit is dropped, the invitation should be resent by: Text to cell phone: [phone]  Will anyone else be joining your video visit? No    Video Start Time: 1:56 PM    Assessment & Plan   Problem List Items Addressed This Visit    None     Visit Diagnoses     Mild intermittent asthma without complication        Relevant Medications    fluticasone-salmeterol (ADVAIR) 100-50 MCG/DOSE inhaler    montelukast (SINGULAIR) 10 MG tablet    albuterol (PROAIR HFA/PROVENTIL HFA/VENTOLIN HFA) 108 (90 Base) MCG/ACT inhaler         Impression is likely moderate persistent asthma. He only has a rescue albuterol inhaler and nebulizer at this point and uses these 2-3 times daily in total.  He had previously been on Advair and montelukast.  He has had 1 hospitalization in the last year and wakes up 1-2 times per week with his asthma symptoms.Appears well and non-toxic and I have low suspicion for impending airway obstruction or respiratory distress.  Plan to start him on a low-dose Advair as well as montelukast and reassess in the next month with a primary or myself as needed.    DDx and Dx discussed with and explained to the pt to their satisfaction.  All questions were answered at this time. Pt expressed understanding of and agreement with this dx, tx, and plan. No further workup warranted and standard medication warnings given. I have given the patient a list of pertinent indications for re-evaluation. Will go to the Emergency Department if symptoms worsen or new concerning symptoms arise. Patient left the call in no apparent distress.     25 minutes spent on the date of the encounter doing chart review, history and exam, documentation and further activities per the note     BMI:   Estimated body mass index is 36.13 kg/m  as calculated from the following:    Height as of 6/8/21: 1.778 m (5'  "10\").    Weight as of 10/23/21: 114.2 kg (251 lb 12.8 oz).     See Patient Instructions    No follow-ups on file.    DILLAN Chavez  [facility] Galindo is a 27 year old who presents for the following health issues     History of Present Illness     Asthma:  He presents for follow up of asthma.  He has no cough, no wheezing, and no shortness of breath. He is using a relief medication 2-3 times per day. He does not have a controller medication. Patient is aware of the following triggers: animal dander, cold air and exercise or sports. The patient has had a visit to the Emergency Room, Urgent Care or Hospital due to asthma since the last clinic visit. He has been to the Emergency Room or Urgent Care 1 time.He has had a Hospitalization 0 times.    He eats 2-3 servings of fruits and vegetables daily.He consumes 0 sweetened beverage(s) daily.He exercises with enough effort to increase his heart rate 30 to 60 minutes per day.  He exercises with enough effort to increase his heart rate 5 days per week. He is missing 1 dose(s) of medications per week.  He is not taking prescribed medications regularly due to remembering to take.     The last 2-3 weeks has had an exacerbation of his asthma symptoms. Using albuterol inhaler and nebs 2-3xs daily for his symptoms. He has cats.    Review of Systems   Constitutional, HEENT, cardiovascular, pulmonary, gi and gu systems are negative, except as otherwise noted.      Objective           Vitals:  No vitals were obtained today due to virtual visit.    Physical Exam   GENERAL: Healthy, alert and no distress  EYES: Eyes grossly normal to inspection.  No discharge or erythema, or obvious scleral/conjunctival abnormalities.  RESP: No audible wheeze, cough, or visible cyanosis.  No visible retractions or increased work of breathing.    SKIN: Visible skin clear. No significant rash, abnormal pigmentation or lesions.  NEURO: Cranial nerves grossly " intact.  Mentation and speech appropriate for age.  PSYCH: Mentation appears normal, affect normal/bright, judgement and insight intact, normal speech and appearance well-groomed.      Video-Visit Details    Type of service:  Video Visit    Video End Time:2:08 PM    Originating Location (pt. Location): Home    Distant Location (provider location):  [facility]     Platform [facility] for [facility] Visit: [facility]

## 2022-11-18 ENCOUNTER — VIRTUAL VISIT (OUTPATIENT)
Dept: FAMILY MEDICINE | Facility: CLINIC | Age: 27
End: 2022-11-18
Payer: COMMERCIAL

## 2022-11-18 DIAGNOSIS — U07.1 INFECTION DUE TO 2019 NOVEL CORONAVIRUS: Primary | ICD-10-CM

## 2022-11-18 PROCEDURE — 99213 OFFICE O/P EST LOW 20 MIN: CPT | Mod: CS | Performed by: FAMILY MEDICINE

## 2022-11-18 ASSESSMENT — ASTHMA QUESTIONNAIRES
QUESTION_3 LAST FOUR WEEKS HOW OFTEN DID YOUR ASTHMA SYMPTOMS (WHEEZING, COUGHING, SHORTNESS OF BREATH, CHEST TIGHTNESS OR PAIN) WAKE YOU UP AT NIGHT OR EARLIER THAN USUAL IN THE MORNING: NOT AT ALL
QUESTION_5 LAST FOUR WEEKS HOW WOULD YOU RATE YOUR ASTHMA CONTROL: COMPLETELY CONTROLLED
QUESTION_2 LAST FOUR WEEKS HOW OFTEN HAVE YOU HAD SHORTNESS OF BREATH: NOT AT ALL
ACT_TOTALSCORE: 25
QUESTION_4 LAST FOUR WEEKS HOW OFTEN HAVE YOU USED YOUR RESCUE INHALER OR NEBULIZER MEDICATION (SUCH AS ALBUTEROL): NOT AT ALL
QUESTION_1 LAST FOUR WEEKS HOW MUCH OF THE TIME DID YOUR ASTHMA KEEP YOU FROM GETTING AS MUCH DONE AT WORK, SCHOOL OR AT HOME: NONE OF THE TIME
ACT_TOTALSCORE: 25

## 2022-11-18 NOTE — PROGRESS NOTES
Mateo is a 27 year old who is being evaluated via a billable telephone visit.      What phone number would you like to be contacted at? [phone]  How would you like to obtain your AVS? Anastasia Yu   Mateo is a 27 year old presenting for the following health issues:  Covid 19 Treatment (Tested positive yesterday)      HPI       COVID-19 Symptom Review  How many days ago did these symptoms start? Monday    Are any of the following symptoms significant for you?    New or worsening difficulty breathing? No    Worsening cough? Yes, I am coughing up mucus.    Fever or chills? Yes, I felt feverish or had chills.    Headache: YES- massive migraines    Sore throat: No    Chest pain: No    Diarrhea: No    Body aches? YES    What treatments has patient tried? Dayquil and excedrin   Does patient live in a nursing home, group home, or shelter? No  Does patient have a way to get food/medications during quarantined? Yes, I have a friend or family member who can help me.      Review of Systems   Constitutional, HEENT, cardiovascular, pulmonary, GI, , musculoskeletal, neuro, skin, endocrine and psych systems are negative, except as otherwise noted.      Objective           Vitals:  No vitals were obtained today due to virtual visit.    Physical Exam   healthy, alert and no distress  PSYCH: Alert and oriented times 3; coherent speech, normal   rate and volume, able to articulate logical thoughts, able   to abstract reason, no tangential thoughts, no hallucinations   or delusions  His affect is normal  RESP: No cough, no audible wheezing, able to talk in full sentences  Remainder of exam unable to be completed due to telephone visits    A/P:  (U07.1) Infection due to 2019 novel coronavirus  (primary encounter diagnosis)  Comment:   Plan: nirmatrelvir and ritonavir (PAXLOVID) therapy         pack        Mild symptoms. H/o asthma but no wheezing or sob. Elected to treat with Paxlovid. Discussed potential side effects  and interactions. Patient will hold Advair while taking medication. Okay to use albuterol as needed. Discussed s/s when to follow up in clinic for evaluation as needed.    Erickson Gregory MD          Phone call duration: 9 minutes

## 2022-11-28 ENCOUNTER — NURSE TRIAGE (OUTPATIENT)
Dept: FAMILY MEDICINE | Facility: CLINIC | Age: 27
End: 2022-11-28

## 2022-11-28 NOTE — TELEPHONE ENCOUNTER
"S-(situation): Pt calling in with chest pain.     B-(background): Tested positive for COVID x1 week ago, states \"I believe I am having myocarditis sx.\"     A-(assessment): States he is having left sided chest pain, rates 4/10, that radiates into upper back, left shoulder, and neck. States \"There is a sharp pain in my chest when I take in a deep breath.\" Denies SOB, no difficulty breathing, no wheezing or stridor noted on phone by RN. Pt was speaking in complete sentences, answering questions appropriately. Denies feeling tachycardia or bradycardia. No cough.     R-(recommendations): Reviewed advice under care tab, verbalized understanding and agreeable to plan. States he will go to [facility]. No further questions.     Reason for Disposition    Pain also in shoulder(s) or arm(s) or jaw    Additional Information    Negative: SEVERE difficulty breathing (e.g., struggling for each breath, speaks in single words)    Negative: Passed out (i.e., fainted, collapsed and was not responding)    Negative: Difficult to awaken or acting confused (e.g., disoriented, slurred speech)    Negative: Shock suspected (e.g., cold/pale/clammy skin, too weak to stand, low BP, rapid pulse)    Negative: Chest pain lasting longer than 5 minutes and ANY of the following:* Over 44 years old* Over 30 years old and at least one cardiac risk factor (e.g., diabetes mellitus, high blood pressure, high cholesterol, smoker, or strong family history of heart disease)* History of heart disease (i.e., angina, heart attack, heart failure, bypass surgery, takes nitroglycerin)* Pain is crushing, pressure-like, or heavy    Negative: Heart beating < 50 beats per minute OR > 140 beats per minute    Negative: Visible sweat on face or sweat dripping down face    Negative: Sounds like a life-threatening emergency to the triager    Negative: Followed an injury to chest    Negative: SEVERE chest pain    Protocols used: CHEST PAIN-A-OH    Sid DHILLON RN    "

## 2022-12-27 ENCOUNTER — OFFICE VISIT (OUTPATIENT)
Dept: FAMILY MEDICINE | Facility: CLINIC | Age: 27
End: 2022-12-27
Payer: COMMERCIAL

## 2022-12-27 VITALS
DIASTOLIC BLOOD PRESSURE: 84 MMHG | HEART RATE: 83 BPM | TEMPERATURE: 98.7 F | OXYGEN SATURATION: 97 % | RESPIRATION RATE: 16 BRPM | HEIGHT: 69 IN | BODY MASS INDEX: 37.03 KG/M2 | WEIGHT: 250 LBS | SYSTOLIC BLOOD PRESSURE: 134 MMHG

## 2022-12-27 DIAGNOSIS — H43.399 VITREOUS FLOATERS, UNSPECIFIED LATERALITY: ICD-10-CM

## 2022-12-27 DIAGNOSIS — Z00.00 ROUTINE GENERAL MEDICAL EXAMINATION AT A HEALTH CARE FACILITY: Primary | ICD-10-CM

## 2022-12-27 PROCEDURE — 90471 IMMUNIZATION ADMIN: CPT | Performed by: FAMILY MEDICINE

## 2022-12-27 PROCEDURE — 99395 PREV VISIT EST AGE 18-39: CPT | Mod: 25 | Performed by: FAMILY MEDICINE

## 2022-12-27 PROCEDURE — 90686 IIV4 VACC NO PRSV 0.5 ML IM: CPT | Performed by: FAMILY MEDICINE

## 2022-12-27 ASSESSMENT — ENCOUNTER SYMPTOMS
JOINT SWELLING: 0
HEMATOCHEZIA: 0
PARESTHESIAS: 0
NAUSEA: 0
DYSURIA: 0
ABDOMINAL PAIN: 0
SORE THROAT: 0
ARTHRALGIAS: 0
HEARTBURN: 0
HEADACHES: 0
CHILLS: 0
FEVER: 0
DIARRHEA: 0
DIZZINESS: 0
NERVOUS/ANXIOUS: 0
SHORTNESS OF BREATH: 0
HEMATURIA: 0
MYALGIAS: 0
CONSTIPATION: 0
EYE PAIN: 0
COUGH: 0
PALPITATIONS: 0
FREQUENCY: 0
WEAKNESS: 0

## 2022-12-27 ASSESSMENT — ASTHMA QUESTIONNAIRES
QUESTION_5 LAST FOUR WEEKS HOW WOULD YOU RATE YOUR ASTHMA CONTROL: COMPLETELY CONTROLLED
ACT_TOTALSCORE: 25
QUESTION_1 LAST FOUR WEEKS HOW MUCH OF THE TIME DID YOUR ASTHMA KEEP YOU FROM GETTING AS MUCH DONE AT WORK, SCHOOL OR AT HOME: NONE OF THE TIME
QUESTION_3 LAST FOUR WEEKS HOW OFTEN DID YOUR ASTHMA SYMPTOMS (WHEEZING, COUGHING, SHORTNESS OF BREATH, CHEST TIGHTNESS OR PAIN) WAKE YOU UP AT NIGHT OR EARLIER THAN USUAL IN THE MORNING: NOT AT ALL
QUESTION_2 LAST FOUR WEEKS HOW OFTEN HAVE YOU HAD SHORTNESS OF BREATH: NOT AT ALL
QUESTION_4 LAST FOUR WEEKS HOW OFTEN HAVE YOU USED YOUR RESCUE INHALER OR NEBULIZER MEDICATION (SUCH AS ALBUTEROL): NOT AT ALL
ACT_TOTALSCORE: 25

## 2022-12-27 ASSESSMENT — PAIN SCALES - GENERAL: PAINLEVEL: NO PAIN (0)

## 2022-12-27 NOTE — PROGRESS NOTES
SUBJECTIVE:   CC: Mateo is an 27 year old who presents for preventative health visit.          Patient has been advised of split billing requirements and indicates understanding: Yes     Healthy Habits:     Getting at least 3 servings of Calcium per day:  NO    Bi-annual eye exam:  NO    Dental care twice a year:  NO    Sleep apnea or symptoms of sleep apnea:  Daytime drowsiness    Diet:  Regular (no restrictions)    Frequency of exercise:  2-3 days/week    Duration of exercise:  30-45 minutes    Taking medications regularly:  Yes    Medication side effects:  None    PHQ-2 Total Score: 0    Additional concerns today:  Yes     Preventive -    Immunization History   Administered Date(s) Administered     COVID-19 Vaccine 12+ (Pfizer) 05/26/2021, 06/16/2021     DTAP (<7y) 1997, 1997, 1997, 07/13/1999, 08/21/2002     FLU 6-35 months 02/21/2007, 01/21/2013     HEPA 08/14/2007, 01/28/2009     HPV 03/01/2017, 05/15/2017     HPV9 10/19/2020     HepB 1997, 1997, 1997     Hib (PRP-T) 1997, 1997, 1997     Influenza (IIV3) PF 12/12/2002, 01/21/2013     Influenza Vaccine >6 months (Alfuria,Fluzone) 10/16/2019, 09/14/2020, 12/27/2022     MMR 07/13/1999, 08/21/2002     Meningococcal (Menomune ) 01/28/2009     Meningococcal ACWY (Menactra ) 03/03/2014     Pneumococcal 23 valent 06/08/2021     Poliovirus, inactivated (IPV) 1997, 1997, 07/13/1999, 08/21/2002     TDAP Vaccine (Adacel) 01/28/2009, 09/14/2020     Varicella 02/26/1998, 08/14/2007         - Colon CA screen: Colonoscopy, age 45-75 every 10 years or FIT every year or Cologuard every 3 years  No family hx of colon cancer       SH:    Marital status: single   Kids: no   Employment: tech   Exercise: yes   Tobacco: no   Etoh: yes , occasionally   Recreational drugs: no   Caffeine:   100 to 150 mg daily   Coffee and monsters sometimes     Wt Readings from Last 4 Encounters:   12/27/22 113.4 kg (250 lb)   10/23/21  114.2 kg (251 lb 12.8 oz)   06/08/21 115.7 kg (255 lb)   10/19/20 122.6 kg (270 lb 6 oz)           Today's PHQ-2 Score:   PHQ-2 ( 1999 Pfizer) 12/27/2022   Q1: Little interest or pleasure in doing things -   Q2: Feeling down, depressed or hopeless -   PHQ-2 Score -   PHQ-2 Total Score (12-17 Years)- Positive if 3 or more points; Administer PHQ-A if positive -   Q1: Little interest or pleasure in doing things -   Q2: Feeling down, depressed or hopeless -   PHQ-2 Score Incomplete       Have you ever done Advance Care Planning? (For example, a Health Directive, POLST, or a discussion with a medical provider or your loved ones about your wishes): No, advance care planning information given to patient to review.  Patient declined advance care planning discussion at this time.    Social History     Tobacco Use     Smoking status: Never     Smokeless tobacco: Never   Substance Use Topics     Alcohol use: Yes     If you drink alcohol do you typically have >3 drinks per day or >7 drinks per week? No      AUDIT - Alcohol Use Disorders Identification Test - Reproduced from the World Health Organization Audit 2001 (Second Edition) 5/22/2019   1.  How often do you have a drink containing alcohol? 2 to 3 times a week   2.  How many drinks containing alcohol do you have on a typical day when you are drinking? 5 or 6   3.  How often do you have five or more drinks on one occasion? Weekly   4.  How often during the last year have you found that you were not able to stop drinking once you had started? Never   5.  How often during the last year have you failed to do what was normally expected of you because of drinking? Never   6.  How often during the last year have you needed a first drink in the morning to get yourself going after a heavy drinking session? Never   7.  How often during the last year have you had a feeling of guilt or remorse after drinking? Never   8.  How often during the last year have you been unable to remember  what happened the night before because of your drinking? Less than monthly   9.  Have you or someone else been injured because of your drinking? No   10. Has a relative, friend, doctor or other health care worker been concerned about your drinking or suggested you cut down? No   TOTAL SCORE 9       Last PSA: No results found for: PSA    Reviewed orders with patient. Reviewed health maintenance and updated orders accordingly - Yes  Labs reviewed in EPIC  BP Readings from Last 3 Encounters:   12/27/22 134/84   10/23/21 120/70   07/02/21 136/86    Wt Readings from Last 3 Encounters:   12/27/22 113.4 kg (250 lb)   10/23/21 114.2 kg (251 lb 12.8 oz)   06/08/21 115.7 kg (255 lb)                  Patient Active Problem List   Diagnosis     Allergic rhinitis due to animal dander     Diagnostic skin and sensitization tests     Vitamin D deficiency     Closed fracture of metacarpal bone     BMI 36.0-36.9,adult     History reviewed. No pertinent surgical history.    Social History     Tobacco Use     Smoking status: Never     Smokeless tobacco: Never   Substance Use Topics     Alcohol use: Yes     Family History   Problem Relation Age of Onset     Gastrointestinal Disease Mother         Fatty Liver      Asthma Father      Alzheimer Disease Maternal Grandmother         Dementia     Thyroid Disease Maternal Grandmother      Hypertension Maternal Grandmother      Circulatory Maternal Grandfather         AAA     Respiratory Paternal Grandmother         emphezima          Current Outpatient Medications   Medication Sig Dispense Refill     albuterol (PROAIR HFA/PROVENTIL HFA/VENTOLIN HFA) 108 (90 Base) MCG/ACT inhaler Inhale 2 puffs into the lungs every 6 hours 18 g 3     albuterol (PROVENTIL) (2.5 MG/3ML) 0.083% neb solution Take 1 vial (2.5 mg) by nebulization every 4 hours as needed for shortness of breath / dyspnea or wheezing 90 mL 0     azelastine (ASTELIN) 0.1 % nasal spray Spray 1 spray into both nostrils 2 times daily 2  Bottle 3     BIOTIN PO        clindamycin (CLEOCIN T) 1 % external lotion Apply topically 2 times daily 60 mL 2     doxycycline hyclate (VIBRAMYCIN) 100 MG capsule Take 1 capsule (100 mg) by mouth daily 30 capsule 2     fluticasone-salmeterol (ADVAIR) 100-50 MCG/DOSE inhaler Inhale 1 puff into the lungs every 12 hours 60 each 11     ibuprofen (ADVIL/MOTRIN) 800 MG tablet Take 1 tablet (800 mg) by mouth every 8 hours as needed for moderate pain 30 tablet 0     multivitamin w/minerals (THERA-VIT-M) tablet Take 1 tablet by mouth daily       Omega-3 Fatty Acids (FISH OIL PO)        triamcinolone (KENALOG) 0.1 % external ointment Apply topically 2 times daily 30 g 0     montelukast (SINGULAIR) 10 MG tablet Take 1 tablet (10 mg) by mouth At Bedtime 90 tablet 3     No Known Allergies  Recent Labs   Lab Test 07/02/21  1207 05/22/19  1031 03/29/17  1656 01/12/15  0812   LDL  --  125*  --   --    HDL  --  46  --   --    TRIG  --  105  --   --    ALT 81* 55  --  416*   CR 0.84 0.98  --  1.09*   GFRESTIMATED >90 >90  --  88   GFRESTBLACK >90 >90  --  >90  African American GFR Calc     POTASSIUM 4.0 4.5  --  4.5   TSH  --  1.23 0.74 1.35        Reviewed and updated as needed this visit by clinical staff   Tobacco  Allergies  Meds              Reviewed and updated as needed this visit by Provider                 Past Medical History:   Diagnosis Date     Allergic rhinitis due to animal dander 3/99 skin tests pos. for cat/dog only--tests per Dr. Matos     Asthma, mild persistent      Diagnostic skin and sensitization tests 3/99 skin tests pos. for cat/dog only     Obesity (BMI 30.0-34.9)       History reviewed. No pertinent surgical history.    Review of Systems   Constitutional: Negative for chills and fever.   HENT: Negative for congestion, ear pain, hearing loss and sore throat.    Eyes: Negative for pain and visual disturbance.   Respiratory: Negative for cough and shortness of breath.    Cardiovascular: Negative for  "chest pain, palpitations and peripheral edema.   Gastrointestinal: Negative for abdominal pain, constipation, diarrhea, heartburn, hematochezia and nausea.   Genitourinary: Negative for dysuria, frequency, genital sores, hematuria, impotence, penile discharge and urgency.   Musculoskeletal: Negative for arthralgias, joint swelling and myalgias.   Skin: Negative for rash.   Neurological: Negative for dizziness, weakness, headaches and paresthesias.   Psychiatric/Behavioral: Negative for mood changes. The patient is not nervous/anxious.          OBJECTIVE:   /84   Pulse 83   Temp 98.7  F (37.1  C) (Tympanic)   Resp 16   Ht 1.753 m (5' 9\")   Wt 113.4 kg (250 lb)   SpO2 97%   BMI 36.92 kg/m      Physical Exam  GENERAL: healthy, alert and no distress  EYES: Eyes grossly normal to inspection, PERRL and conjunctivae and sclerae normal  HENT: ear canals and TM's normal, nose and mouth without ulcers or lesions  NECK: no adenopathy, no asymmetry, masses, or scars and thyroid normal to palpation  RESP: lungs clear to auscultation - no rales, rhonchi or wheezes  CV: regular rate and rhythm, normal S1 S2, no S3 or S4, no murmur, click or rub, no peripheral edema and peripheral pulses strong  ABDOMEN: soft, nontender, no hepatosplenomegaly, no masses and bowel sounds normal  MS: no gross musculoskeletal defects noted, no edema  SKIN: no suspicious lesions or rashes  NEURO: Normal strength and tone, mentation intact and speech normal  PSYCH: mentation appears normal, affect normal/bright        ASSESSMENT/PLAN:       ICD-10-CM    1. Routine general medical examination at a health care facility  Z00.00       2. Vitreous floaters, unspecified laterality  H43.399 Adult Eye  Referral        -We discussed recommendation of 150 min moderate intensity exercise /week   - We discussed the need for heart healthy diet including very low on carbonated beverages sugar  - We discussed recommendation of moderation of " alcohol,    Patient has been advised of split billing requirements and indicates understanding: Yes      COUNSELING:   Reviewed preventive health counseling, as reflected in patient instructions       Regular exercise       Healthy diet/nutrition       Immunizations    Vaccinated for: Influenza              He reports that he has never smoked. He has never used smokeless tobacco.        Blayne Schmidt MD  [facility]

## 2023-05-10 DIAGNOSIS — J45.20 MILD INTERMITTENT ASTHMA WITHOUT COMPLICATION: ICD-10-CM

## 2023-05-10 RX ORDER — MONTELUKAST SODIUM 10 MG/1
10 TABLET ORAL AT BEDTIME
Qty: 90 TABLET | Refills: 3 | Status: SHIPPED | OUTPATIENT
Start: 2023-05-10 | End: 2024-03-04

## 2023-05-10 RX ORDER — FLUTICASONE PROPIONATE AND SALMETEROL 100; 50 UG/1; UG/1
1 POWDER RESPIRATORY (INHALATION) EVERY 12 HOURS
Qty: 60 EACH | Refills: 11 | Status: SHIPPED | OUTPATIENT
Start: 2023-05-10 | End: 2024-03-04

## 2023-06-23 ENCOUNTER — E-VISIT (OUTPATIENT)
Dept: FAMILY MEDICINE | Facility: CLINIC | Age: 28
End: 2023-06-23
Payer: COMMERCIAL

## 2023-06-23 DIAGNOSIS — J30.81 ALLERGY TO DOGS: Primary | ICD-10-CM

## 2023-06-23 PROCEDURE — 99207 PR NON-BILLABLE SERV PER CHARTING: CPT | Performed by: PHYSICIAN ASSISTANT

## 2023-07-02 NOTE — PATIENT INSTRUCTIONS
Shahbaz Galindo,    My apologies that I am only addressing this now. There is no charge for this e-visit. I was out of the office. Usually steroids are reserved for asthma exacerbations. For pet allergies, I would try over the counter antihistamines such as Zyrtec, Allegra, Claritin, nasal spray like Flonase, or their generic equivalents over the counter as directed.    Otherwise, I would schedule a video, phone or face to face visit to discuss.    If you have any questions or concerns, please feel free to call us at [phone]    Sincerely,    Freeman Alanis PA-C    Did you know?      You can schedule a video visit for follow-up appointments as well as future appointments for certain conditions.  Please see the below link.     [email or web address]    If you have not already done so,  I encourage you to sign up for [employer] ([email or web address]).  This will allow you to review your results, securely communicate with a provider, and schedule virtual visits as well.

## 2023-07-02 NOTE — TELEPHONE ENCOUNTER
Recommended antihistamines and Flonase otc and virtual vs in person visit to discuss prn.     Provider E-Visit time total (minutes):3

## 2023-07-13 NOTE — PROGRESS NOTES
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  - Adult Allergy/Asthma Referral; Future  Has severe allergy to dog dander, possibly saliva  Had episodes where he felt he may have his airways close and thought he might die. GF with dog for exposure.   Encouraged to be careful  Should take zyrtec as well if needed in situation where he may com into contact    Pectoralis muscle strain, initial encounter  From weight lifting  - cyclobenzaprine (FLEXERIL) 5 MG tablet; Take 1 tablet (5 mg) by mouth 3 times daily as needed for muscle spasms  Rest for a few days and when healed may gently work back into exercise           BMI:   Estimated body mass index is 35.88 kg/m  as calculated from the following:    Height as of this encounter: 1.753 m (5' 9\").    Weight as of this encounter: 110.2 kg (243 lb).       Follow up as needed    KARLA HAMILTON PA-C  [facility]   Mateo is a 28 year old, presenting for the following health issues:  Allergies and Sinus Problem         No data to display              History of Present Illness     Asthma:  He presents for follow up of asthma.  He has no cough, no wheezing, and no shortness of breath. He is using a relief medication a few times a month. He typically misses taking his controller medication 2 time(s) per week.Patient is aware of the following triggers: animal dander. The patient has not had a visit to the Emergency Room, Urgent Care or Hospital due to asthma since the last clinic visit.     He eats 0-1 servings of fruits and vegetables daily.He consumes 0 sweetened beverage(s) daily.He exercises with enough effort to increase his heart rate 60 or more minutes per day.  He exercises with enough effort to increase his heart rate 5 days " "per week. He is missing 2 dose(s) of medications per week.               Review of Systems   Constitutional, HEENT, cardiovascular, pulmonary, gi and gu systems are negative, except as otherwise noted.      Objective    /63   Pulse 89   Temp 98.6  F (37  C) (Tympanic)   Resp 16   Ht 1.753 m (5' 9\")   Wt 110.2 kg (243 lb)   SpO2 97%   BMI 35.88 kg/m    Body mass index is 35.88 kg/m .  Physical Exam   GENERAL: healthy, alert and no distress  NECK: no adenopathy, no asymmetry, masses, or scars and thyroid normal to palpation  RESP: lungs clear to auscultation - no rales, rhonchi or wheezes  CV: regular rate and rhythm, normal S1 S2, no S3 or S4, no murmur, click or rub, no peripheral edema and peripheral pulses strong  MS: no gross musculoskeletal defects noted, no edema                      "

## 2023-07-20 ENCOUNTER — OFFICE VISIT (OUTPATIENT)
Dept: FAMILY MEDICINE | Facility: CLINIC | Age: 28
End: 2023-07-20
Payer: COMMERCIAL

## 2023-07-20 VITALS
OXYGEN SATURATION: 97 % | RESPIRATION RATE: 16 BRPM | DIASTOLIC BLOOD PRESSURE: 63 MMHG | SYSTOLIC BLOOD PRESSURE: 121 MMHG | WEIGHT: 243 LBS | HEART RATE: 89 BPM | BODY MASS INDEX: 35.99 KG/M2 | HEIGHT: 69 IN | TEMPERATURE: 98.6 F

## 2023-07-20 DIAGNOSIS — J30.81 ALLERGY TO DOGS: Primary | ICD-10-CM

## 2023-07-20 DIAGNOSIS — S29.011A PECTORALIS MUSCLE STRAIN, INITIAL ENCOUNTER: ICD-10-CM

## 2023-07-20 PROCEDURE — 99214 OFFICE O/P EST MOD 30 MIN: CPT | Performed by: PHYSICIAN ASSISTANT

## 2023-07-20 RX ORDER — CYCLOBENZAPRINE HCL 5 MG
5 TABLET ORAL 3 TIMES DAILY PRN
Qty: 30 TABLET | Refills: 0 | Status: SHIPPED | OUTPATIENT
Start: 2023-07-20

## 2023-07-20 RX ORDER — EPINEPHRINE 0.3 MG/.3ML
0.3 INJECTION SUBCUTANEOUS PRN
Qty: 2 EACH | Refills: 0 | Status: SHIPPED | OUTPATIENT
Start: 2023-07-20

## 2023-07-20 ASSESSMENT — ASTHMA QUESTIONNAIRES: ACT_TOTALSCORE: 24

## 2023-07-20 ASSESSMENT — PAIN SCALES - GENERAL: PAINLEVEL: NO PAIN (0)

## 2023-11-27 ENCOUNTER — PATIENT OUTREACH (OUTPATIENT)
Dept: CARE COORDINATION | Facility: CLINIC | Age: 28
End: 2023-11-27
Payer: COMMERCIAL

## 2023-12-11 ENCOUNTER — PATIENT OUTREACH (OUTPATIENT)
Dept: CARE COORDINATION | Facility: CLINIC | Age: 28
End: 2023-12-11
Payer: COMMERCIAL

## 2023-12-26 ENCOUNTER — TELEPHONE (OUTPATIENT)
Dept: FAMILY MEDICINE | Facility: CLINIC | Age: 28
End: 2023-12-26
Payer: COMMERCIAL

## 2023-12-26 NOTE — TELEPHONE ENCOUNTER
Fluticasone/salm not covered by insurance. The preferred alternative is BUDES/FORMOT ARE. Please send new Rx.

## 2023-12-26 NOTE — TELEPHONE ENCOUNTER
Fluticasone/salm disk discontinued. Advair diskus generic not covered. Please send over new drug to replace or do a PA.

## 2024-02-11 ENCOUNTER — HEALTH MAINTENANCE LETTER (OUTPATIENT)
Age: 29
End: 2024-02-11

## 2024-02-27 ENCOUNTER — TELEPHONE (OUTPATIENT)
Dept: FAMILY MEDICINE | Facility: CLINIC | Age: 29
End: 2024-02-27
Payer: COMMERCIAL

## 2024-02-27 DIAGNOSIS — J45.20 MILD INTERMITTENT ASTHMA WITHOUT COMPLICATION: ICD-10-CM

## 2024-02-27 RX ORDER — FLUTICASONE PROPIONATE AND SALMETEROL 100; 50 UG/1; UG/1
1 POWDER RESPIRATORY (INHALATION) EVERY 12 HOURS
Qty: 60 EACH | Refills: 11 | Status: CANCELLED | OUTPATIENT
Start: 2024-02-27

## 2024-03-04 ENCOUNTER — TELEPHONE (OUTPATIENT)
Dept: FAMILY MEDICINE | Facility: CLINIC | Age: 29
End: 2024-03-04
Payer: COMMERCIAL

## 2024-03-04 DIAGNOSIS — J45.20 MILD INTERMITTENT ASTHMA WITHOUT COMPLICATION: ICD-10-CM

## 2024-03-04 RX ORDER — ALBUTEROL SULFATE 0.83 MG/ML
2.5 SOLUTION RESPIRATORY (INHALATION) EVERY 4 HOURS PRN
Qty: 90 ML | Refills: 0 | Status: SHIPPED | OUTPATIENT
Start: 2024-03-04

## 2024-03-04 RX ORDER — FLUTICASONE PROPIONATE AND SALMETEROL 100; 50 UG/1; UG/1
1 POWDER RESPIRATORY (INHALATION) EVERY 12 HOURS
Qty: 60 EACH | Refills: 2 | Status: SHIPPED | OUTPATIENT
Start: 2024-03-04 | End: 2024-03-13

## 2024-03-04 RX ORDER — MONTELUKAST SODIUM 10 MG/1
10 TABLET ORAL AT BEDTIME
Qty: 90 TABLET | Refills: 0 | Status: SHIPPED | OUTPATIENT
Start: 2024-03-04 | End: 2024-03-13

## 2024-03-04 RX ORDER — ALBUTEROL SULFATE 90 UG/1
2 AEROSOL, METERED RESPIRATORY (INHALATION) EVERY 6 HOURS
Qty: 18 G | Refills: 0 | Status: SHIPPED | OUTPATIENT
Start: 2024-03-04 | End: 2024-03-13

## 2024-03-04 NOTE — TELEPHONE ENCOUNTER
Patient requesting medications to be transferred to a different pharmacy as soon as possible.     Patient explaining he is completely out of medications, and is still taking all medications requested.    Medications and pharmacy pended.    Patient requesting a call-back once complete. Please route back to team to inform patient.    SANTOSH Mcmillan RN  [facility]

## 2024-03-04 NOTE — TELEPHONE ENCOUNTER
RN called patient and relayed providers message. Patient verbalized understanding.     RN discussed needing to establish care with a PCP as Freeman Alanis is same day provider and cannot manage long term medications. Patient verbalized understanding and stated he would set up an appointment with an AN provider for preventative visit and to manage further refills.       Tiffanie Ortega RN on 3/4/2024 at 3:37 PM

## 2024-03-06 NOTE — PATIENT INSTRUCTIONS
Preventive Care Advice   This is general advice given by our system to help you stay healthy. However, your care team may have specific advice just for you. Please talk to your care team about your preventive care needs.  Nutrition  Eat 5 or more servings of fruits and vegetables each day.  Try wheat bread, brown rice and whole grain pasta (instead of white bread, rice, and pasta).  Get enough calcium and vitamin D. Check the label on foods and aim for 100% of the RDA (recommended daily allowance).  Lifestyle  Exercise at least 150 minutes each week   (30 minutes a day, 5 days a week).  Do muscle strengthening activities 2 days a week. These help control your weight and prevent disease.  No smoking.  Wear sunscreen to prevent skin cancer.  Have a dental exam and cleaning every 6 months.  Yearly exams  See your health care team every year to talk about:  Any changes in your health.  Any medicines your care team has prescribed.  Preventive care, family planning, and ways to prevent chronic diseases.  Shots (vaccines)   HPV shots (up to age 26), if you've never had them before.  Hepatitis B shots (up to age 59), if you've never had them before.  COVID-19 shot: Get this shot when it's due.  Flu shot: Get a flu shot every year.  Tetanus shot: Get a tetanus shot every 10 years.  Pneumococcal, hepatitis A, and RSV shots: Ask your care team if you need these based on your risk.  Shingles shot (for age 50 and up).  General health tests  Diabetes screening:  Starting at age 35, Get screened for diabetes at least every 3 years.  If you are younger than age 35, ask your care team if you should be screened for diabetes.  Cholesterol test: At age 39, start having a cholesterol test every 5 years, or more often if advised.  Bone density scan (DEXA): At age 50, ask your care team if you should have this scan for osteoporosis (brittle bones).  Hepatitis C: Get tested at least once in your life.  STIs (sexually transmitted  infections)  Before age 24: Ask your care team if you should be screened for STIs.  After age 24: Get screened for STIs if you're at risk. You are at risk for STIs (including HIV) if:  You are sexually active with more than one person.  You don't use condoms every time.  You or a partner was diagnosed with a sexually transmitted infection.  If you are at risk for HIV, ask about PrEP medicine to prevent HIV.  Get tested for HIV at least once in your life, whether you are at risk for HIV or not.  Cancer screening tests  Cervical cancer screening: If you have a cervix, begin getting regular cervical cancer screening tests at age 21. Most people who have regular screenings with normal results can stop after age 65. Talk about this with your provider.  Breast cancer scan (mammogram): If you've ever had breasts, begin having regular mammograms starting at age 40. This is a scan to check for breast cancer.  Colon cancer screening: It is important to start screening for colon cancer at age 45.  Have a colonoscopy test every 10 years (or more often if you're at risk) Or, ask your provider about stool tests like a FIT test every year or Cologuard test every 3 years.  To learn more about your testing options, visit: [email or web address]/[email or web address].  For help making a decision, visit: https://bit.ly/zc73277.  Prostate cancer screening test: If you have a prostate and are age 55 to 69, ask your provider if you would benefit from a yearly prostate cancer screening test.  Lung cancer screening: If you are a current or former smoker age 50 to 80, ask your care team if ongoing lung cancer screenings are right for you.  For informational purposes only. Not to replace the advice of your health care provider. Copyright   2023 [employer]. All rights reserved. Clinically reviewed by the [facility] Transitions Program. [employer] Text 528313 - REV 01/24.

## 2024-03-13 ENCOUNTER — OFFICE VISIT (OUTPATIENT)
Dept: FAMILY MEDICINE | Facility: CLINIC | Age: 29
End: 2024-03-13
Payer: COMMERCIAL

## 2024-03-13 VITALS
DIASTOLIC BLOOD PRESSURE: 78 MMHG | TEMPERATURE: 98.1 F | HEIGHT: 70 IN | HEART RATE: 75 BPM | BODY MASS INDEX: 36.08 KG/M2 | SYSTOLIC BLOOD PRESSURE: 137 MMHG | RESPIRATION RATE: 16 BRPM | WEIGHT: 252 LBS | OXYGEN SATURATION: 98 %

## 2024-03-13 DIAGNOSIS — J45.20 MILD INTERMITTENT ASTHMA WITHOUT COMPLICATION: ICD-10-CM

## 2024-03-13 DIAGNOSIS — R10.31 GROIN PAIN, RIGHT: ICD-10-CM

## 2024-03-13 DIAGNOSIS — Z00.00 ROUTINE GENERAL MEDICAL EXAMINATION AT A HEALTH CARE FACILITY: Primary | ICD-10-CM

## 2024-03-13 PROCEDURE — 99213 OFFICE O/P EST LOW 20 MIN: CPT | Mod: 25 | Performed by: PHYSICIAN ASSISTANT

## 2024-03-13 PROCEDURE — 99395 PREV VISIT EST AGE 18-39: CPT | Mod: 25 | Performed by: PHYSICIAN ASSISTANT

## 2024-03-13 PROCEDURE — 90471 IMMUNIZATION ADMIN: CPT | Performed by: PHYSICIAN ASSISTANT

## 2024-03-13 PROCEDURE — 90677 PCV20 VACCINE IM: CPT | Performed by: PHYSICIAN ASSISTANT

## 2024-03-13 RX ORDER — IPRATROPIUM BROMIDE AND ALBUTEROL SULFATE 2.5; .5 MG/3ML; MG/3ML
1 SOLUTION RESPIRATORY (INHALATION) EVERY 6 HOURS PRN
Qty: 90 ML | Refills: 3 | Status: SHIPPED | OUTPATIENT
Start: 2024-03-13

## 2024-03-13 RX ORDER — ALBUTEROL SULFATE 90 UG/1
2 AEROSOL, METERED RESPIRATORY (INHALATION) EVERY 6 HOURS
Qty: 18 G | Refills: 0 | Status: SHIPPED | OUTPATIENT
Start: 2024-03-13

## 2024-03-13 RX ORDER — FLUTICASONE PROPIONATE AND SALMETEROL 100; 50 UG/1; UG/1
1 POWDER RESPIRATORY (INHALATION) EVERY 12 HOURS
Qty: 60 EACH | Refills: 2 | Status: SHIPPED | OUTPATIENT
Start: 2024-03-13 | End: 2024-07-01

## 2024-03-13 RX ORDER — MONTELUKAST SODIUM 10 MG/1
10 TABLET ORAL AT BEDTIME
Qty: 90 TABLET | Refills: 0 | Status: SHIPPED | OUTPATIENT
Start: 2024-03-13 | End: 2024-08-12

## 2024-03-13 SDOH — HEALTH STABILITY: PHYSICAL HEALTH: ON AVERAGE, HOW MANY MINUTES DO YOU ENGAGE IN EXERCISE AT THIS LEVEL?: 30 MIN

## 2024-03-13 SDOH — HEALTH STABILITY: PHYSICAL HEALTH: ON AVERAGE, HOW MANY DAYS PER WEEK DO YOU ENGAGE IN MODERATE TO STRENUOUS EXERCISE (LIKE A BRISK WALK)?: 3 DAYS

## 2024-03-13 ASSESSMENT — SOCIAL DETERMINANTS OF HEALTH (SDOH): HOW OFTEN DO YOU GET TOGETHER WITH FRIENDS OR RELATIVES?: MORE THAN THREE TIMES A WEEK

## 2024-03-13 ASSESSMENT — ASTHMA QUESTIONNAIRES
QUESTION_1 LAST FOUR WEEKS HOW MUCH OF THE TIME DID YOUR ASTHMA KEEP YOU FROM GETTING AS MUCH DONE AT WORK, SCHOOL OR AT HOME: NONE OF THE TIME
QUESTION_4 LAST FOUR WEEKS HOW OFTEN HAVE YOU USED YOUR RESCUE INHALER OR NEBULIZER MEDICATION (SUCH AS ALBUTEROL): ONCE A WEEK OR LESS
ACT_TOTALSCORE: 24
QUESTION_3 LAST FOUR WEEKS HOW OFTEN DID YOUR ASTHMA SYMPTOMS (WHEEZING, COUGHING, SHORTNESS OF BREATH, CHEST TIGHTNESS OR PAIN) WAKE YOU UP AT NIGHT OR EARLIER THAN USUAL IN THE MORNING: NOT AT ALL
ACT_TOTALSCORE: 24
QUESTION_5 LAST FOUR WEEKS HOW WOULD YOU RATE YOUR ASTHMA CONTROL: COMPLETELY CONTROLLED
QUESTION_2 LAST FOUR WEEKS HOW OFTEN HAVE YOU HAD SHORTNESS OF BREATH: NOT AT ALL

## 2024-03-13 ASSESSMENT — PAIN SCALES - GENERAL: PAINLEVEL: MILD PAIN (2)

## 2024-03-13 NOTE — PROGRESS NOTES
"Preventive Care Visit  [facility]  Rohan Diaz PA-C, Physician Assistant - Medical  Mar 13, 2024      Assessment & Plan     Routine general medical examination at a health care facility  Completed  Work on limiting alcohol. Work on improved diet.  Push for more exercise.     Mild intermittent asthma without complication  Stable   - albuterol (PROAIR HFA/PROVENTIL HFA/VENTOLIN HFA) 108 (90 Base) MCG/ACT inhaler; Inhale 2 puffs into the lungs every 6 hours  - montelukast (SINGULAIR) 10 MG tablet; Take 1 tablet (10 mg) by mouth at bedtime  - fluticasone-salmeterol (ADVAIR) 100-50 MCG/ACT inhaler; Inhale 1 puff into the lungs every 12 hours  - ipratropium - albuterol 0.5 mg/2.5 mg/3 mL (DUONEB) 0.5-2.5 (3) MG/3ML neb solution; Take 1 vial (3 mLs) by nebulization every 6 hours as needed for shortness of breath, wheezing or cough  Refilled meds     Groin pain, right  No hernia noted, testicular exam normal. I think likely muscular in nature and is improving. Should reach out if not improving     Patient has been advised of split billing requirements and indicates understanding: Yes          BMI  Estimated body mass index is 36.68 kg/m  as calculated from the following:    Height as of this encounter: 1.765 m (5' 9.5\").    Weight as of this encounter: 114.3 kg (252 lb).   Weight management plan: Discussed healthy diet and exercise guidelines    Counseling  Appropriate preventive services were discussed with this patient, including applicable screening as appropriate for fall prevention, nutrition, physical activity, Tobacco-use cessation, weight loss and cognition.  Checklist reviewing preventive services available has been given to the patient.  Reviewed patient's diet, addressing concerns and/or questions.   He is at risk for lack of exercise and has been provided with information to increase physical activity for the benefit of his well-being.   The patient was instructed to see the dentist " every 6 months.   The patient reports drinking more than one alcoholic drink per day and sometimes engages in binge or excessive drinking. The patient was counseled and given information about possible harmful effects of excessive alcohol intake as well as where to get help for alcohol problems.     Follow up yearly for preventive visit     Aimee Galindo is a 29 year old, presenting for the following:  Physical, Establish Care, and Groin Pain        3/13/2024     2:38 PM   Additional Questions   Roomed by Rissa Kwok MA   Accompanied by Self        Health Care Directive  Patient does not have a Health Care Directive or Living Will: Discussed advance care planning with patient; however, patient declined at this time.    HPI          3/13/2024   General Health   How would you rate your overall physical health? Good   Feel stress (tense, anxious, or unable to sleep) To some extent   (!) STRESS CONCERN      3/13/2024   Nutrition   Three or more servings of calcium each day? Yes   Diet: Regular (no restrictions)   How many servings of fruit and vegetables per day? (!) 2-3   How many sweetened beverages each day? 0-1         3/13/2024   Exercise   Days per week of moderate/strenous exercise 3 days   Average minutes spent exercising at this level 30 min         3/13/2024   Social Factors   Frequency of gathering with friends or relatives More than three times a week   Worry food won't last until get money to buy more No   Food not last or not have enough money for food? No   Do you have housing?  Yes   Are you worried about losing your housing? No   Lack of transportation? No   Unable to get utilities (heat,electricity)? No         3/13/2024   Dental   Dentist two times every year? (!) NO         3/13/2024   TB Screening   Were you born outside of [place]?  No         Today's PHQ-2 Score:       3/13/2024     2:12 PM   PHQ-2 ( 1999 [employer])   Q1: Little interest or pleasure in doing things 1   Q2: Feeling down, depressed or  hopeless 1   PHQ-2 Score 2   Q1: Little interest or pleasure in doing things Several days   Q2: Feeling down, depressed or hopeless Several days   PHQ-2 Score 2           3/13/2024   Substance Use   Alcohol more than 3/day or more than 7/wk Yes   How often do you have a drink containing alcohol 2 to 3 times a week   How many alcohol drinks on typical day 7 to 9   How often do you have 5+ drinks at one occasion Weekly   Audit 2/3 Score 6   How often not able to stop drinking once started Monthly   How often failed to do what normally expected Monthly   How often needed first drink in am after a heavy drinking session Less than monthly   How often feeling of guilt or remorse after drinking Weekly   How often unable to remember what happened the night before Monthly   Have you or someone else been injured because of your drinking Yes, but not in the last year   Has anyone been concerned or suggested you cut down on drinking Yes, but not in the last year   TOTAL SCORE - AUDIT 23   Do you use any other substances recreationally? (!) ALCOHOL    (!) OTHER     Social History     Tobacco Use    Smoking status: Never    Smokeless tobacco: Never   Vaping Use    Vaping Use: Some days    Substances: Nicotine   Substance Use Topics    Alcohol use: Yes    Drug use: No           3/13/2024   STI Screening   New sexual partner(s) since last STI/HIV test? (!) YES          3/13/2024   Contraception/Family Planning   Questions about contraception or family planning No        Reviewed and updated as needed this visit by Provider   Tobacco  Allergies  Meds  Problems  Med Hx  Surg Hx  Fam Hx            Past Medical History:   Diagnosis Date    Allergic rhinitis due to animal dander 3/99 skin tests pos. for cat/dog only--tests per Dr. Matos    Asthma, mild persistent     Diagnostic skin and sensitization tests 3/99 skin tests pos. for cat/dog only    Obesity (BMI 30.0-34.9)      History reviewed. No pertinent surgical  "history.  Labs reviewed in EPIC      Review of Systems  Constitutional, neuro, ENT, endocrine, pulmonary, cardiac, gastrointestinal, genitourinary, musculoskeletal, integument and psychiatric systems are negative, except as otherwise noted.     Objective    Exam  /78   Pulse 75   Temp 98.1  F (36.7  C) (Tympanic)   Resp 16   Ht 1.765 m (5' 9.5\")   Wt 114.3 kg (252 lb)   SpO2 98%   BMI 36.68 kg/m     Estimated body mass index is 36.68 kg/m  as calculated from the following:    Height as of this encounter: 1.765 m (5' 9.5\").    Weight as of this encounter: 114.3 kg (252 lb).    Physical Exam  GENERAL: alert and no distress  NECK: no adenopathy, no asymmetry, masses, or scars  RESP: lungs clear to auscultation - no rales, rhonchi or wheezes  CV: regular rate and rhythm, normal S1 S2, no S3 or S4, no murmur, click or rub, no peripheral edema  ABDOMEN: soft, nontender, no hepatosplenomegaly, no masses and bowel sounds normal   (male): normal male genitalia without lesions or urethral discharge, no hernia  MS: no gross musculoskeletal defects noted, no edema  SKIN: no suspicious lesions or rashes        Signed Electronically by: Rohan Diaz PA-C    "

## 2024-03-25 ENCOUNTER — TELEPHONE (OUTPATIENT)
Dept: FAMILY MEDICINE | Facility: CLINIC | Age: 29
End: 2024-03-25
Payer: COMMERCIAL

## 2024-03-25 NOTE — TELEPHONE ENCOUNTER
Wixela inhub diskus      Last Written Prescription Date:    Last Fill Quantity: ,   # refills:   Last Office Visit:   Future Office visit:       Routing refill request to provider for review/approval because:  Drug not active on patient's medication list

## 2024-03-25 NOTE — TELEPHONE ENCOUNTER
fluticasone-salmeterol (ADVAIR) 100-50 MCG/ACT inhaler  sent in by PCP 3/13/24.    Tiffany Gabriel RN

## 2024-07-01 DIAGNOSIS — J45.20 MILD INTERMITTENT ASTHMA WITHOUT COMPLICATION: ICD-10-CM

## 2024-07-01 RX ORDER — FLUTICASONE PROPIONATE AND SALMETEROL 100; 50 UG/1; UG/1
1 POWDER RESPIRATORY (INHALATION) EVERY 12 HOURS
Qty: 60 EACH | Refills: 2 | Status: SHIPPED | OUTPATIENT
Start: 2024-07-01

## 2024-07-30 ENCOUNTER — OFFICE VISIT (OUTPATIENT)
Dept: URGENT CARE | Facility: URGENT CARE | Age: 29
End: 2024-07-30
Payer: COMMERCIAL

## 2024-07-30 VITALS
SYSTOLIC BLOOD PRESSURE: 145 MMHG | BODY MASS INDEX: 36.39 KG/M2 | TEMPERATURE: 99 F | WEIGHT: 250 LBS | OXYGEN SATURATION: 99 % | DIASTOLIC BLOOD PRESSURE: 80 MMHG | HEART RATE: 83 BPM

## 2024-07-30 DIAGNOSIS — H61.21 IMPACTED CERUMEN OF RIGHT EAR: ICD-10-CM

## 2024-07-30 DIAGNOSIS — R03.0 ELEVATED BLOOD PRESSURE READING WITHOUT DIAGNOSIS OF HYPERTENSION: ICD-10-CM

## 2024-07-30 DIAGNOSIS — H60.502 ACUTE OTITIS EXTERNA OF LEFT EAR, UNSPECIFIED TYPE: Primary | ICD-10-CM

## 2024-07-30 PROCEDURE — 99213 OFFICE O/P EST LOW 20 MIN: CPT | Performed by: FAMILY MEDICINE

## 2024-07-30 PROCEDURE — 69209 REMOVE IMPACTED EAR WAX UNI: CPT | Mod: RT | Performed by: FAMILY MEDICINE

## 2024-07-30 RX ORDER — CIPROFLOXACIN AND DEXAMETHASONE 3; 1 MG/ML; MG/ML
4 SUSPENSION/ DROPS AURICULAR (OTIC) 2 TIMES DAILY
Qty: 7.5 ML | Refills: 0 | Status: SHIPPED | OUTPATIENT
Start: 2024-07-30 | End: 2024-08-06

## 2024-07-30 RX ORDER — DICLOFENAC SODIUM 75 MG/1
75 TABLET, DELAYED RELEASE ORAL 2 TIMES DAILY PRN
Qty: 20 TABLET | Refills: 0 | Status: SHIPPED | OUTPATIENT
Start: 2024-07-30

## 2024-07-30 ASSESSMENT — PAIN SCALES - GENERAL: PAINLEVEL: SEVERE PAIN (6)

## 2024-07-30 NOTE — PROGRESS NOTES
Chief compaint: ear discomfort    New ear buds the past week  Not sure if it's fitting right   Started getting better when he stopped wearing   But then he did generic swimmers ear drop last night   And got worse     Has had ear pain in the left ear since then  Left ear started swelling    No dental or jaw pain     BP elevated today but asymptomatic. No headache no blurring of vision no chest pain no shortness of breath no dizziness no unsteadiness no numbness no weakness  Tried over the counter medications without relief  No fevers or chills chest pain or shortness of breath   No rash  Ill-contacts:  none  Because of persistent and worsening symptoms came in to be seen    Problem list and histories reviewed & adjusted, as indicated.  Additional history: as documented    Problem list, Medication list, Allergies, and Medical/Social/Surgical histories reviewed in EPIC and updated as appropriate.    ROS:  Constitutional, HEENT, cardiovascular, pulmonary, gi and gu systems are negative, except as otherwise noted.    OBJECTIVE:                                                    BP (!) 145/80   Pulse 83   Temp 99  F (37.2  C) (Tympanic)   Wt 113.4 kg (250 lb)   SpO2 99%   BMI 36.39 kg/m    Body mass index is 36.39 kg/m .  GENERAL: healthy, alert and no distress  EYES: pink palpebral conjunctiva, anicteric sclera, pupils equally reactive to light and accomodation, extraocular muscles intact full and equal.  ENT: midline nasal septum, no nasal congestion   Left ear positive  tragal tenderness,external auditory canal swollen about 25 % no mastoid tenderness erythematous, bulging, and partially obstructed by external auditory canal swelling, tympaninc membrane   Right ear: impacted cerumen, after ear flushing: no tragal tenderness, no mastoid tenderness normal tympaninc membrane   NECK: no adenopathy, no asymmetry or  masses  MS: no gross musculoskeletal defects noted, no edema  NEURO: Normal strength and tone, mentation  intact and speech normal    Diagnostic Test Results:  No results found for this or any previous visit (from the past 24 hour(s)).     Ear flushing performed by MA    ASSESSMENT/PLAN:                                                        ICD-10-CM    1. Acute otitis externa of left ear, unspecified type  H60.502 amoxicillin-clavulanate (AUGMENTIN) 875-125 MG tablet     ciprofloxacin-dexAMETHasone (CIPRODEX) 0.3-0.1 % otic suspension     diclofenac (VOLTAREN) 75 MG EC tablet      2. Impacted cerumen of right ear  H61.21 diclofenac (VOLTAREN) 75 MG EC tablet      3. Elevated blood pressure reading without diagnosis of hypertension  R03.0           Significant otitis externa, tympaninc membrane slightly obstructed by swelling but appears red   Prescribed with above  Recommend follow up with primary care provider if no relief, sooner if worse  Adverse reactions of medications discussed.  Over the counter medications discussed.   Aware to come back in if with worsening symptoms or if no relief despite treatment plan  Patient voiced understanding and had no further questions.     Advised follow up with primary care provider for recheck of BP when symptoms improve    MD Sena Bess MD  [facility] [place]

## 2024-08-10 DIAGNOSIS — J45.20 MILD INTERMITTENT ASTHMA WITHOUT COMPLICATION: ICD-10-CM

## 2024-08-12 RX ORDER — MONTELUKAST SODIUM 10 MG/1
1 TABLET ORAL AT BEDTIME
Qty: 90 TABLET | Refills: 1 | Status: SHIPPED | OUTPATIENT
Start: 2024-08-12

## 2025-02-25 DIAGNOSIS — J45.20 MILD INTERMITTENT ASTHMA WITHOUT COMPLICATION: ICD-10-CM

## 2025-02-27 RX ORDER — FLUTICASONE PROPIONATE AND SALMETEROL 100; 50 UG/1; UG/1
1 POWDER RESPIRATORY (INHALATION) EVERY 12 HOURS
Qty: 60 EACH | Refills: 0 | Status: SHIPPED | OUTPATIENT
Start: 2025-02-27

## 2025-04-20 ENCOUNTER — HEALTH MAINTENANCE LETTER (OUTPATIENT)
Age: 30
End: 2025-04-20

## 2025-05-26 DIAGNOSIS — J45.20 MILD INTERMITTENT ASTHMA WITHOUT COMPLICATION: ICD-10-CM

## 2025-05-27 NOTE — TELEPHONE ENCOUNTER
Medication Question or Refill    Contacts       Contact Date/Time Type Contact Phone/Fax    05/26/2025 08:09 PM CDT Phone (Incoming) Mateo Carpenter (Self)             What medication are you calling about (include dose and sig)?: fluticasone-salmeterol (ADVAIR) 100-50 MCG/ACT inhaler  and montelukast (SINGULAIR) 10 MG tablet     Preferred Pharmacy:     [place] [id number] [place] [address] [address]  Phone: [phone] Fax: [phone]      Controlled Substance Agreement on file:   CSA -- Patient Level:    CSA: None found at the patient level.       Who prescribed the medication?: PCP    Do you need a refill? Yes    When did you use the medication last? A couple days ago    Patient offered an appointment? No    Do you have any questions or concerns?  Yes: If pt needs to be seen first he would like a CB to be notified of that      Could we send this information to you in [facility] or would you prefer to receive a phone call?:   Patient would prefer a phone call   Okay to leave a detailed message?: Yes at Cell number on file:    Telephone Information:   Mobile [phone]

## 2025-05-28 RX ORDER — FLUTICASONE PROPIONATE AND SALMETEROL 100; 50 UG/1; UG/1
1 POWDER RESPIRATORY (INHALATION) EVERY 12 HOURS
Qty: 60 EACH | Refills: 0 | Status: SHIPPED | OUTPATIENT
Start: 2025-05-28

## 2025-05-28 RX ORDER — MONTELUKAST SODIUM 10 MG/1
1 TABLET ORAL AT BEDTIME
Qty: 30 TABLET | Refills: 0 | Status: SHIPPED | OUTPATIENT
Start: 2025-05-28

## 2025-06-24 DIAGNOSIS — J45.20 MILD INTERMITTENT ASTHMA WITHOUT COMPLICATION: ICD-10-CM

## 2025-06-25 RX ORDER — MONTELUKAST SODIUM 10 MG/1
1 TABLET ORAL AT BEDTIME
Qty: 30 TABLET | Refills: 0 | Status: SHIPPED | OUTPATIENT
Start: 2025-06-25